# Patient Record
Sex: FEMALE | Race: WHITE | Employment: FULL TIME | URBAN - NONMETROPOLITAN AREA
[De-identification: names, ages, dates, MRNs, and addresses within clinical notes are randomized per-mention and may not be internally consistent; named-entity substitution may affect disease eponyms.]

---

## 2018-06-12 ENCOUNTER — HOSPITAL ENCOUNTER (EMERGENCY)
Age: 23
Discharge: HOME | End: 2018-06-12
Payer: COMMERCIAL

## 2018-06-12 VITALS
DIASTOLIC BLOOD PRESSURE: 75 MMHG | WEIGHT: 174 LBS | TEMPERATURE: 98.3 F | OXYGEN SATURATION: 98 % | RESPIRATION RATE: 16 BRPM | SYSTOLIC BLOOD PRESSURE: 124 MMHG | HEART RATE: 75 BPM

## 2018-06-12 DIAGNOSIS — R51.9 SINUS HEADACHE: Primary | ICD-10-CM

## 2018-06-12 PROCEDURE — 99204 OFFICE O/P NEW MOD 45 MIN: CPT

## 2018-06-12 PROCEDURE — 99213 OFFICE O/P EST LOW 20 MIN: CPT | Performed by: NURSE PRACTITIONER

## 2018-06-12 RX ORDER — NAPROXEN 500 MG/1
500 TABLET ORAL 2 TIMES DAILY WITH MEALS
Qty: 20 TABLET | Refills: 0 | Status: SHIPPED | OUTPATIENT
Start: 2018-06-12 | End: 2018-08-14

## 2018-06-12 RX ORDER — PREDNISONE 20 MG/1
20 TABLET ORAL DAILY
Qty: 5 TABLET | Refills: 0 | Status: SHIPPED | OUTPATIENT
Start: 2018-06-12 | End: 2018-06-17

## 2018-06-12 ASSESSMENT — ENCOUNTER SYMPTOMS
NAUSEA: 0
SINUS PAIN: 0
EYE PAIN: 0
SINUS PRESSURE: 1
VOMITING: 0
ABDOMINAL PAIN: 0
DIARRHEA: 0
CHEST TIGHTNESS: 0
RHINORRHEA: 0
BACK PAIN: 0
PHOTOPHOBIA: 1
SORE THROAT: 0
COUGH: 0

## 2018-06-12 ASSESSMENT — PAIN DESCRIPTION - PAIN TYPE: TYPE: ACUTE PAIN

## 2018-06-12 ASSESSMENT — PAIN SCALES - GENERAL: PAINLEVEL_OUTOF10: 7

## 2018-06-12 ASSESSMENT — PAIN DESCRIPTION - LOCATION: LOCATION: EYE;FACE

## 2018-06-12 ASSESSMENT — PAIN DESCRIPTION - ORIENTATION: ORIENTATION: RIGHT;LEFT;UPPER

## 2018-06-12 ASSESSMENT — PAIN DESCRIPTION - DESCRIPTORS: DESCRIPTORS: ACHING;PRESSURE

## 2018-06-13 ENCOUNTER — HOSPITAL ENCOUNTER (EMERGENCY)
Age: 23
Discharge: HOME | End: 2018-06-14
Payer: COMMERCIAL

## 2018-06-13 VITALS
DIASTOLIC BLOOD PRESSURE: 92 MMHG | RESPIRATION RATE: 20 BRPM | HEART RATE: 96 BPM | TEMPERATURE: 97.8 F | SYSTOLIC BLOOD PRESSURE: 137 MMHG | OXYGEN SATURATION: 99 %

## 2018-06-13 DIAGNOSIS — G43.809 OTHER MIGRAINE WITHOUT STATUS MIGRAINOSUS, NOT INTRACTABLE: Primary | ICD-10-CM

## 2018-06-13 PROCEDURE — 99283 EMERGENCY DEPT VISIT LOW MDM: CPT

## 2018-06-13 PROCEDURE — 6360000002 HC RX W HCPCS: Performed by: NURSE PRACTITIONER

## 2018-06-13 PROCEDURE — 6370000000 HC RX 637 (ALT 250 FOR IP): Performed by: NURSE PRACTITIONER

## 2018-06-13 PROCEDURE — 96372 THER/PROPH/DIAG INJ SC/IM: CPT

## 2018-06-13 RX ORDER — ONDANSETRON 4 MG/1
4 TABLET, ORALLY DISINTEGRATING ORAL ONCE
Status: COMPLETED | OUTPATIENT
Start: 2018-06-13 | End: 2018-06-13

## 2018-06-13 RX ORDER — ORPHENADRINE CITRATE 30 MG/ML
60 INJECTION INTRAMUSCULAR; INTRAVENOUS ONCE
Status: COMPLETED | OUTPATIENT
Start: 2018-06-13 | End: 2018-06-13

## 2018-06-13 RX ORDER — DIPHENHYDRAMINE HCL 25 MG
25 TABLET ORAL ONCE
Status: COMPLETED | OUTPATIENT
Start: 2018-06-13 | End: 2018-06-13

## 2018-06-13 RX ADMIN — ORPHENADRINE CITRATE 60 MG: 30 INJECTION INTRAMUSCULAR; INTRAVENOUS at 23:08

## 2018-06-13 RX ADMIN — DIPHENHYDRAMINE HCL 25 MG: 25 TABLET ORAL at 23:08

## 2018-06-13 RX ADMIN — ONDANSETRON 4 MG: 4 TABLET, ORALLY DISINTEGRATING ORAL at 23:08

## 2018-06-13 ASSESSMENT — ENCOUNTER SYMPTOMS
VOMITING: 0
BACK PAIN: 0
NAUSEA: 0
SORE THROAT: 0
COUGH: 0
SHORTNESS OF BREATH: 0
PHOTOPHOBIA: 1
ABDOMINAL PAIN: 0
WHEEZING: 0
DIARRHEA: 0
BLOOD IN STOOL: 0

## 2018-06-13 ASSESSMENT — PAIN DESCRIPTION - PAIN TYPE: TYPE: ACUTE PAIN

## 2018-06-13 ASSESSMENT — PAIN SCALES - GENERAL: PAINLEVEL_OUTOF10: 9

## 2018-06-13 ASSESSMENT — PAIN DESCRIPTION - LOCATION: LOCATION: HEAD

## 2018-06-14 PROCEDURE — 96372 THER/PROPH/DIAG INJ SC/IM: CPT

## 2018-06-14 PROCEDURE — 6360000002 HC RX W HCPCS: Performed by: NURSE PRACTITIONER

## 2018-06-14 RX ORDER — SUMATRIPTAN 6 MG/.5ML
6 INJECTION, SOLUTION SUBCUTANEOUS ONCE
Status: COMPLETED | OUTPATIENT
Start: 2018-06-14 | End: 2018-06-14

## 2018-06-14 RX ADMIN — SUMATRIPTAN 6 MG: 6 INJECTION, SOLUTION SUBCUTANEOUS at 00:34

## 2018-06-29 ENCOUNTER — TELEPHONE (OUTPATIENT)
Dept: FAMILY MEDICINE CLINIC | Age: 23
End: 2018-06-29

## 2018-06-29 ENCOUNTER — OFFICE VISIT (OUTPATIENT)
Dept: FAMILY MEDICINE CLINIC | Age: 23
End: 2018-06-29
Payer: COMMERCIAL

## 2018-06-29 VITALS
BODY MASS INDEX: 30.26 KG/M2 | HEART RATE: 96 BPM | SYSTOLIC BLOOD PRESSURE: 128 MMHG | RESPIRATION RATE: 12 BRPM | HEIGHT: 65 IN | TEMPERATURE: 98.1 F | DIASTOLIC BLOOD PRESSURE: 82 MMHG | WEIGHT: 181.6 LBS

## 2018-06-29 DIAGNOSIS — Z30.011 ENCOUNTER FOR INITIAL PRESCRIPTION OF CONTRACEPTIVE PILLS: ICD-10-CM

## 2018-06-29 DIAGNOSIS — G43.009 MIGRAINE WITHOUT AURA AND WITHOUT STATUS MIGRAINOSUS, NOT INTRACTABLE: Primary | ICD-10-CM

## 2018-06-29 LAB
CHLAMYDIA TRACHOMATIS BY RT-PCR: NOT DETECTED
CONTROL: PRESENT
CT/NG SOURCE: NORMAL
NEISSERIA GONORRHOEAE BY RT-PCR: NOT DETECTED
PREGNANCY TEST URINE, POC: NEGATIVE

## 2018-06-29 PROCEDURE — 99203 OFFICE O/P NEW LOW 30 MIN: CPT | Performed by: NURSE PRACTITIONER

## 2018-06-29 PROCEDURE — 81025 URINE PREGNANCY TEST: CPT | Performed by: NURSE PRACTITIONER

## 2018-06-29 RX ORDER — SUMATRIPTAN 100 MG/1
100 TABLET, FILM COATED ORAL
Qty: 9 TABLET | Refills: 3 | Status: SHIPPED | OUTPATIENT
Start: 2018-06-29 | End: 2018-11-26 | Stop reason: SDUPTHER

## 2018-06-29 RX ORDER — NORGESTIMATE AND ETHINYL ESTRADIOL 7DAYSX3 28
1 KIT ORAL DAILY
Qty: 28 TABLET | Refills: 11 | Status: SHIPPED | OUTPATIENT
Start: 2018-06-29 | End: 2018-08-25 | Stop reason: SDUPTHER

## 2018-06-29 RX ORDER — DIPHENHYDRAMINE HCL 25 MG
25 CAPSULE ORAL EVERY 6 HOURS PRN
COMMUNITY

## 2018-06-29 RX ORDER — IBUPROFEN 400 MG/1
400 TABLET ORAL EVERY 6 HOURS PRN
COMMUNITY
End: 2019-05-28

## 2018-06-29 ASSESSMENT — PATIENT HEALTH QUESTIONNAIRE - PHQ9
2. FEELING DOWN, DEPRESSED OR HOPELESS: 0
SUM OF ALL RESPONSES TO PHQ QUESTIONS 1-9: 0
SUM OF ALL RESPONSES TO PHQ9 QUESTIONS 1 & 2: 0
1. LITTLE INTEREST OR PLEASURE IN DOING THINGS: 0

## 2018-07-02 ENCOUNTER — TELEPHONE (OUTPATIENT)
Dept: FAMILY MEDICINE CLINIC | Age: 23
End: 2018-07-02

## 2018-07-18 ENCOUNTER — OFFICE VISIT (OUTPATIENT)
Dept: FAMILY MEDICINE CLINIC | Age: 23
End: 2018-07-18
Payer: COMMERCIAL

## 2018-07-18 VITALS
TEMPERATURE: 97.9 F | HEART RATE: 82 BPM | HEIGHT: 66 IN | BODY MASS INDEX: 29.25 KG/M2 | SYSTOLIC BLOOD PRESSURE: 108 MMHG | RESPIRATION RATE: 14 BRPM | DIASTOLIC BLOOD PRESSURE: 70 MMHG | WEIGHT: 182 LBS

## 2018-07-18 DIAGNOSIS — N92.6 MISSED PERIOD: ICD-10-CM

## 2018-07-18 DIAGNOSIS — Z01.419 PAP SMEAR, AS PART OF ROUTINE GYNECOLOGICAL EXAMINATION: Primary | ICD-10-CM

## 2018-07-18 LAB
CONTROL: NORMAL
PREGNANCY TEST URINE, POC: NEGATIVE

## 2018-07-18 PROCEDURE — 81025 URINE PREGNANCY TEST: CPT | Performed by: NURSE PRACTITIONER

## 2018-07-18 PROCEDURE — 99395 PREV VISIT EST AGE 18-39: CPT | Performed by: NURSE PRACTITIONER

## 2018-07-18 NOTE — PATIENT INSTRUCTIONS
You may receive a survey about your visit with us today. The feedback from our patients helps us identify what is working well and where the service to all patients can be enhanced. Thank you! Patient Education        Well Visit, Ages 25 to 48: Care Instructions  Your Care Instructions    Physical exams can help you stay healthy. Your doctor has checked your overall health and may have suggested ways to take good care of yourself. He or she also may have recommended tests. At home, you can help prevent illness with healthy eating, regular exercise, and other steps. Follow-up care is a key part of your treatment and safety. Be sure to make and go to all appointments, and call your doctor if you are having problems. It's also a good idea to know your test results and keep a list of the medicines you take. How can you care for yourself at home? · Reach and stay at a healthy weight. This will lower your risk for many problems, such as obesity, diabetes, heart disease, and high blood pressure. · Get at least 30 minutes of physical activity on most days of the week. Walking is a good choice. You also may want to do other activities, such as running, swimming, cycling, or playing tennis or team sports. Discuss any changes in your exercise program with your doctor. · Do not smoke or allow others to smoke around you. If you need help quitting, talk to your doctor about stop-smoking programs and medicines. These can increase your chances of quitting for good. · Talk to your doctor about whether you have any risk factors for sexually transmitted infections (STIs). Having one sex partner (who does not have STIs and does not have sex with anyone else) is a good way to avoid these infections. · Use birth control if you do not want to have children at this time. Talk with your doctor about the choices available and what might be best for you. · Protect your skin from too much sun.  When you're outdoors from 10 a.m. to 4 p.m., stay in the shade or cover up with clothing and a hat with a wide brim. Wear sunglasses that block UV rays. Even when it's cloudy, put broad-spectrum sunscreen (SPF 30 or higher) on any exposed skin. · See a dentist one or two times a year for checkups and to have your teeth cleaned. · Wear a seat belt in the car. · Drink alcohol in moderation, if at all. That means no more than 2 drinks a day for men and 1 drink a day for women. Follow your doctor's advice about when to have certain tests. These tests can spot problems early. For everyone  · Cholesterol. Have the fat (cholesterol) in your blood tested after age 21. Your doctor will tell you how often to have this done based on your age, family history, or other things that can increase your risk for heart disease. · Blood pressure. Have your blood pressure checked during a routine doctor visit. Your doctor will tell you how often to check your blood pressure based on your age, your blood pressure results, and other factors. · Vision. Talk with your doctor about how often to have a glaucoma test.  · Diabetes. Ask your doctor whether you should have tests for diabetes. · Colon cancer. Have a test for colon cancer at age 48. You may have one of several tests. If you are younger than 48, you may need a test earlier if you have any risk factors. Risk factors include whether you already had a precancerous polyp removed from your colon or whether your parent, brother, sister, or child has had colon cancer. For women  · Breast exam and mammogram. Talk to your doctor about when you should have a clinical breast exam and a mammogram. Medical experts differ on whether and how often women under 50 should have these tests. Your doctor can help you decide what is right for you. · Pap test and pelvic exam. Begin Pap tests at age 24. A Pap test is the best way to find cervical cancer.  The test often is part of a pelvic exam. Ask how often to have this

## 2018-07-18 NOTE — PROGRESS NOTES
external genitalia, vulva, vagina, cervix, uterus and adnexa, VULVA: normal appearing vulva with no masses, tenderness or lesions, VAGINA: normal appearing vagina with normal color and discharge, no lesions, PELVIC FLOOR EXAM: no cystocele, rectocele or prolapse noted, CERVIX: normal appearing cervix without discharge or lesions, small amount of blood noted in the os, UTERUS: uterus is normal size, shape, consistency and nontender, ADNEXA: normal adnexa in size, nontender and no masses, no masses, PAP: Pap smear done today, thin-prep method, pt had a gonorrhea and chlamydia test that was negative  A few weeks ago. ASSESSMENT:   well woman  no contraindication to begin use of oral contraceptives  Encounter Diagnoses   Name Primary?  Missed period     Pap smear, as part of routine gynecological examination Yes     PLAN:   pap smear  counseled on breast self exam, STD prevention and use and side effects of OCP's  return annually or prn  Discussed with pt the need for condoms for STD protection. Pt is going to start her birth control the Sunday after her next period.

## 2018-07-19 ENCOUNTER — TELEPHONE (OUTPATIENT)
Dept: FAMILY MEDICINE CLINIC | Age: 23
End: 2018-07-19

## 2018-07-19 DIAGNOSIS — N76.0 BACTERIAL VAGINOSIS: Primary | ICD-10-CM

## 2018-07-19 DIAGNOSIS — B96.89 BACTERIAL VAGINOSIS: Primary | ICD-10-CM

## 2018-07-19 RX ORDER — METRONIDAZOLE 500 MG/1
500 TABLET ORAL 2 TIMES DAILY
Qty: 14 TABLET | Refills: 0 | Status: SHIPPED | OUTPATIENT
Start: 2018-07-19 | End: 2018-07-29

## 2018-07-19 NOTE — TELEPHONE ENCOUNTER
----- Message from DEL Davis CNP sent at 7/19/2018 12:30 PM EDT -----  I left a voicemail for this pt to contact our office regarding her pap test results. She has bacterial vaginosis which is a bacteria that can come from the cervix, it is not a sexually transmitted infection. It is treated with a medication called flagyl which I will send to her pharmacy. Also her pap identified some atypical cells and we are doing further testing on them called an HPV test. The results should be back next week. Based on these results further testing may be indicated, or a repeat pap in one year. She can call us if she has further questions.

## 2018-07-24 LAB — CYTOLOGY THIN PREP PAP: NORMAL

## 2018-07-30 ENCOUNTER — TELEPHONE (OUTPATIENT)
Dept: FAMILY MEDICINE CLINIC | Age: 23
End: 2018-07-30

## 2018-07-30 NOTE — TELEPHONE ENCOUNTER
----- Message from DEL Urbina CNP sent at 7/30/2018 12:04 PM EDT -----  Let Nik Gao know the HPV testing was negative. However, because she did have some abnormal cells on the PAP, recommend she get another PAP in 1 year. She can schedule that with Chance or myself if she wishes.

## 2018-08-13 ENCOUNTER — HOSPITAL ENCOUNTER (EMERGENCY)
Age: 23
Discharge: HOME | End: 2018-08-14
Attending: FAMILY MEDICINE
Payer: COMMERCIAL

## 2018-08-13 ENCOUNTER — APPOINTMENT (OUTPATIENT)
Dept: CT IMAGING | Age: 23
End: 2018-08-13
Payer: COMMERCIAL

## 2018-08-13 ENCOUNTER — TELEPHONE (OUTPATIENT)
Dept: FAMILY MEDICINE CLINIC | Age: 23
End: 2018-08-13

## 2018-08-13 DIAGNOSIS — R10.30 LOWER ABDOMINAL PAIN: Primary | ICD-10-CM

## 2018-08-13 DIAGNOSIS — N30.00 ACUTE CYSTITIS WITHOUT HEMATURIA: ICD-10-CM

## 2018-08-13 LAB
ALBUMIN SERPL-MCNC: 5.1 G/DL (ref 3.5–5.1)
ALP BLD-CCNC: 47 U/L (ref 38–126)
ALT SERPL-CCNC: 11 U/L (ref 11–66)
ANION GAP SERPL CALCULATED.3IONS-SCNC: 14 MEQ/L (ref 8–16)
AST SERPL-CCNC: 13 U/L (ref 5–40)
BACTERIA: ABNORMAL /HPF
BASOPHILS # BLD: 0.1 %
BASOPHILS ABSOLUTE: 0 THOU/MM3 (ref 0–0.1)
BILIRUB SERPL-MCNC: 0.6 MG/DL (ref 0.3–1.2)
BILIRUBIN DIRECT: < 0.2 MG/DL (ref 0–0.3)
BILIRUBIN URINE: NEGATIVE
BLOOD, URINE: NEGATIVE
BUN BLDV-MCNC: 11 MG/DL (ref 7–22)
CALCIUM SERPL-MCNC: 9.7 MG/DL (ref 8.5–10.5)
CASTS 2: ABNORMAL /LPF
CASTS UA: ABNORMAL /LPF
CHARACTER, URINE: ABNORMAL
CHLORIDE BLD-SCNC: 102 MEQ/L (ref 98–111)
CO2: 24 MEQ/L (ref 23–33)
COLOR: YELLOW
CREAT SERPL-MCNC: 0.7 MG/DL (ref 0.4–1.2)
CRYSTALS, UA: ABNORMAL
EOSINOPHIL # BLD: 0.5 %
EOSINOPHILS ABSOLUTE: 0 THOU/MM3 (ref 0–0.4)
EPITHELIAL CELLS, UA: ABNORMAL /HPF
ERYTHROCYTE [DISTWIDTH] IN BLOOD BY AUTOMATED COUNT: 11.9 % (ref 11.5–14.5)
ERYTHROCYTE [DISTWIDTH] IN BLOOD BY AUTOMATED COUNT: 36.9 FL (ref 35–45)
GFR SERPL CREATININE-BSD FRML MDRD: > 90 ML/MIN/1.73M2
GLUCOSE BLD-MCNC: 96 MG/DL (ref 70–108)
GLUCOSE URINE: NEGATIVE MG/DL
HCT VFR BLD CALC: 42.8 % (ref 37–47)
HEMOGLOBIN: 15 GM/DL (ref 12–16)
IMMATURE GRANS (ABS): 0.02 THOU/MM3 (ref 0–0.07)
IMMATURE GRANULOCYTES: 0.2 %
KETONES, URINE: NEGATIVE
LEUKOCYTE ESTERASE, URINE: ABNORMAL
LIPASE: 30.5 U/L (ref 5.6–51.3)
LYMPHOCYTES # BLD: 29.8 %
LYMPHOCYTES ABSOLUTE: 2.5 THOU/MM3 (ref 1–4.8)
MCH RBC QN AUTO: 30.1 PG (ref 26–33)
MCHC RBC AUTO-ENTMCNC: 35 GM/DL (ref 32.2–35.5)
MCV RBC AUTO: 85.8 FL (ref 81–99)
MISCELLANEOUS 2: ABNORMAL
MONOCYTES # BLD: 7.6 %
MONOCYTES ABSOLUTE: 0.6 THOU/MM3 (ref 0.4–1.3)
NITRITE, URINE: POSITIVE
NUCLEATED RED BLOOD CELLS: 0 /100 WBC
OSMOLALITY CALCULATION: 278.7 MOSMOL/KG (ref 275–300)
PH UA: 6.5
PLATELET # BLD: 246 THOU/MM3 (ref 130–400)
PMV BLD AUTO: 10.8 FL (ref 9.4–12.4)
POTASSIUM SERPL-SCNC: 3.7 MEQ/L (ref 3.5–5.2)
PREGNANCY, URINE: NEGATIVE
PROTEIN UA: NEGATIVE
RBC # BLD: 4.99 MILL/MM3 (ref 4.2–5.4)
RBC URINE: ABNORMAL /HPF
RENAL EPITHELIAL, UA: ABNORMAL
SEG NEUTROPHILS: 61.8 %
SEGMENTED NEUTROPHILS ABSOLUTE COUNT: 5.1 THOU/MM3 (ref 1.8–7.7)
SODIUM BLD-SCNC: 140 MEQ/L (ref 135–145)
SPECIFIC GRAVITY, URINE: 1.03 (ref 1–1.03)
TOTAL PROTEIN: 8 G/DL (ref 6.1–8)
UROBILINOGEN, URINE: 1 EU/DL
WBC # BLD: 8.3 THOU/MM3 (ref 4.8–10.8)
WBC UA: ABNORMAL /HPF
YEAST: ABNORMAL

## 2018-08-13 PROCEDURE — 82248 BILIRUBIN DIRECT: CPT

## 2018-08-13 PROCEDURE — 87186 SC STD MICRODIL/AGAR DIL: CPT

## 2018-08-13 PROCEDURE — 80053 COMPREHEN METABOLIC PANEL: CPT

## 2018-08-13 PROCEDURE — 81001 URINALYSIS AUTO W/SCOPE: CPT

## 2018-08-13 PROCEDURE — 87077 CULTURE AEROBIC IDENTIFY: CPT

## 2018-08-13 PROCEDURE — 85025 COMPLETE CBC W/AUTO DIFF WBC: CPT

## 2018-08-13 PROCEDURE — 99284 EMERGENCY DEPT VISIT MOD MDM: CPT

## 2018-08-13 PROCEDURE — 81025 URINE PREGNANCY TEST: CPT

## 2018-08-13 PROCEDURE — 83690 ASSAY OF LIPASE: CPT

## 2018-08-13 PROCEDURE — 74176 CT ABD & PELVIS W/O CONTRAST: CPT

## 2018-08-13 PROCEDURE — 87184 SC STD DISK METHOD PER PLATE: CPT

## 2018-08-13 PROCEDURE — 87086 URINE CULTURE/COLONY COUNT: CPT

## 2018-08-13 PROCEDURE — 36415 COLL VENOUS BLD VENIPUNCTURE: CPT

## 2018-08-13 ASSESSMENT — PAIN DESCRIPTION - ORIENTATION
ORIENTATION: LOWER;LEFT;RIGHT
ORIENTATION: RIGHT;LEFT
ORIENTATION: RIGHT;LEFT;LOWER

## 2018-08-13 ASSESSMENT — ENCOUNTER SYMPTOMS
DIARRHEA: 0
CHEST TIGHTNESS: 0
SHORTNESS OF BREATH: 0
COUGH: 0
VOICE CHANGE: 0
NAUSEA: 0
WHEEZING: 0
BACK PAIN: 0
CONSTIPATION: 0
ABDOMINAL PAIN: 1
VOMITING: 0
TROUBLE SWALLOWING: 0
RECTAL PAIN: 0

## 2018-08-13 ASSESSMENT — PAIN DESCRIPTION - PAIN TYPE
TYPE: ACUTE PAIN

## 2018-08-13 ASSESSMENT — PAIN DESCRIPTION - LOCATION
LOCATION: ABDOMEN

## 2018-08-13 ASSESSMENT — PAIN SCALES - GENERAL
PAINLEVEL_OUTOF10: 3
PAINLEVEL_OUTOF10: 3

## 2018-08-13 ASSESSMENT — PAIN DESCRIPTION - DESCRIPTORS: DESCRIPTORS: SHARP

## 2018-08-14 VITALS
BODY MASS INDEX: 29.82 KG/M2 | OXYGEN SATURATION: 99 % | RESPIRATION RATE: 16 BRPM | HEIGHT: 65 IN | HEART RATE: 96 BPM | DIASTOLIC BLOOD PRESSURE: 77 MMHG | WEIGHT: 179 LBS | SYSTOLIC BLOOD PRESSURE: 121 MMHG | TEMPERATURE: 98.6 F

## 2018-08-14 PROCEDURE — 6360000002 HC RX W HCPCS: Performed by: FAMILY MEDICINE

## 2018-08-14 PROCEDURE — 96365 THER/PROPH/DIAG IV INF INIT: CPT

## 2018-08-14 PROCEDURE — 2580000003 HC RX 258: Performed by: FAMILY MEDICINE

## 2018-08-14 PROCEDURE — 87040 BLOOD CULTURE FOR BACTERIA: CPT

## 2018-08-14 PROCEDURE — 36415 COLL VENOUS BLD VENIPUNCTURE: CPT

## 2018-08-14 RX ORDER — DEXTROSE MONOHYDRATE 50 MG/ML
INJECTION, SOLUTION INTRAVENOUS
Status: DISCONTINUED
Start: 2018-08-14 | End: 2018-08-14 | Stop reason: HOSPADM

## 2018-08-14 RX ORDER — NAPROXEN 500 MG/1
500 TABLET ORAL 2 TIMES DAILY WITH MEALS
Qty: 20 TABLET | Refills: 0 | Status: SHIPPED | OUTPATIENT
Start: 2018-08-14 | End: 2018-12-10

## 2018-08-14 RX ORDER — CEFTRIAXONE 1 G/1
INJECTION, POWDER, FOR SOLUTION INTRAMUSCULAR; INTRAVENOUS
Status: DISCONTINUED
Start: 2018-08-14 | End: 2018-08-14 | Stop reason: HOSPADM

## 2018-08-14 RX ORDER — NITROFURANTOIN 25; 75 MG/1; MG/1
100 CAPSULE ORAL 2 TIMES DAILY
Qty: 20 CAPSULE | Refills: 0 | Status: SHIPPED | OUTPATIENT
Start: 2018-08-14 | End: 2018-08-24

## 2018-08-14 RX ORDER — CIPROFLOXACIN 500 MG/1
500 TABLET, FILM COATED ORAL 2 TIMES DAILY
Qty: 6 TABLET | Refills: 0 | Status: SHIPPED | OUTPATIENT
Start: 2018-08-14 | End: 2018-08-24

## 2018-08-14 RX ADMIN — CEFTRIAXONE SODIUM 1 G: 1 INJECTION, POWDER, FOR SOLUTION INTRAMUSCULAR; INTRAVENOUS at 00:26

## 2018-08-14 ASSESSMENT — PAIN DESCRIPTION - LOCATION: LOCATION: ABDOMEN

## 2018-08-14 ASSESSMENT — PAIN SCALES - GENERAL: PAINLEVEL_OUTOF10: 1

## 2018-08-14 ASSESSMENT — PAIN DESCRIPTION - PAIN TYPE: TYPE: ACUTE PAIN

## 2018-08-14 NOTE — ED TRIAGE NOTES
Patient arrived to ED with c/o lower bilateral abdominal pain that has been on-going for approximately 1 week. Patient reports possible chance of pregnancy, but took a home pregnancy test and it was negative. Monitor applied and vitals taken/assessment completed. Blood work and urine has been sent, by intake.

## 2018-08-14 NOTE — ED PROVIDER NOTES
[facility] EMERGENCY DEPT      CHIEF COMPLAINT       Chief Complaint   Patient presents with    Abdominal Pain       Nurses Notes reviewed and I agree except as noted in the HPI. HISTORY OF PRESENT ILLNESS    Randi Hyde is a 26 y.o. female who presents  To the emergency department with lower abdominal pain for the last 1 week. Patient said that the pain is dull achy constantly present nonradiating. I offered her some pain medication but she declined saying the pain is not that severe. Patient has no associated fevers. She denies any associated dysuria or increasing frequency or pyuria. She denies any associated vaginal bleeding or vaginal discharge. She said that she is not pregnant and her last menstrual period was 2  weeks ago. She denies any flank pain, hematuria, she denies any melena or hematochezia. She denies nausea vomiting or diarrhea she denies jaundice. She denies upper abdominal pain she denies any abdominal distention. She denies any chest symptomatology she has no headache neck pain or back pain she has no musculoskeletal skin or neurologic symptoms at this time. Clinically she looks well in no acute distress with an otherwise normal exam.      REVIEW OF SYSTEMS     Review of Systems   Constitutional: Negative. Negative for chills, fever and unexpected weight change. HENT: Negative. Negative for congestion, trouble swallowing and voice change. Respiratory: Negative for cough, chest tightness, shortness of breath and wheezing. Cardiovascular: Negative for chest pain and leg swelling. Gastrointestinal: Positive for abdominal pain. Negative for constipation, diarrhea, nausea, rectal pain and vomiting. Genitourinary: Negative. Negative for decreased urine volume, difficulty urinating, dysuria, flank pain, frequency, genital sores, hematuria, pelvic pain, urgency, vaginal bleeding and vaginal discharge. Musculoskeletal: Negative.   Negative for back pain, myalgias tracheal deviation present. No thyromegaly present. Cardiovascular: Normal rate, regular rhythm, normal heart sounds and intact distal pulses. No murmur heard. Pulmonary/Chest: Effort normal and breath sounds normal. No respiratory distress. She has no wheezes. She has no rales. She exhibits no tenderness. Abdominal: Soft. Bowel sounds are normal. She exhibits no distension and no mass. There is no tenderness. There is no rebound and no guarding. Musculoskeletal: Normal range of motion. She exhibits no edema or tenderness. Lymphadenopathy:     She has no cervical adenopathy. Neurological: She is alert and oriented to person, place, and time. No cranial nerve deficit. Coordination normal.   Skin: Skin is warm. No rash noted. She is not diaphoretic. No erythema. No pallor. patient declined pelvic exam at this time. DIFFERENTIAL DIAGNOSIS:       DIAGNOSTIC RESULTS     EKG: All EKG's are interpreted by the Emergency Department Physician who either signs or Co-signs this chart in the absence of a cardiologist.      RADIOLOGY: non-plain film images(s) such as CT, Ultrasound and MRI are read by the radiologist.  Plain radiographic images are visualized and preliminarily interpreted by the emergency physician unless otherwise stated below. CT ABDOMEN PELVIS WO CONTRAST Additional Contrast? None   Final Result   1. Nonvisualization of appendix without acute appearing inflammatory change of the right lower quadrant. 2. Mild stool retention. 3. Dominant follicle or small cyst of the right ovary noted. **This report has been created using voice recognition software. It may contain minor errors which are inherent in voice recognition technology. **      Final report electronically signed by Dr. Nando Lopez on 8/13/2018 11:58 PM          LABS:   Labs Reviewed   URINE WITH REFLEXED MICRO - Abnormal; Notable for the following:        Result Value    Nitrite, Urine POSITIVE (*)

## 2018-08-15 LAB
ORGANISM: ABNORMAL
URINE CULTURE REFLEX: ABNORMAL

## 2018-08-16 ENCOUNTER — TELEPHONE (OUTPATIENT)
Dept: PHARMACY | Age: 23
End: 2018-08-16

## 2018-08-19 LAB
BLOOD CULTURE, ROUTINE: NORMAL
BLOOD CULTURE, ROUTINE: NORMAL

## 2018-08-25 DIAGNOSIS — Z30.011 ENCOUNTER FOR INITIAL PRESCRIPTION OF CONTRACEPTIVE PILLS: ICD-10-CM

## 2018-08-25 RX ORDER — NORGESTIMATE AND ETHINYL ESTRADIOL 7DAYSX3 28
1 KIT ORAL DAILY
Qty: 28 TABLET | Refills: 11 | Status: SHIPPED | OUTPATIENT
Start: 2018-08-25 | End: 2018-11-02 | Stop reason: SDUPTHER

## 2018-08-25 NOTE — TELEPHONE ENCOUNTER
From: Ashley Garcia  Sent: 8/24/2018 1:04 PM EDT  Subject: Medication Renewal Request    Ashley Garcia would like a refill of the following medications:     Justyna Lyme Estrad Triphasic (TRI-SPRINTEC) 0.18/0.215/0.25 MG-35 MCG TABS Breannariosman Cantu, APRN - CNP]    Preferred pharmacy: [address] [place] [address] -  [phone]

## 2018-10-30 ENCOUNTER — OFFICE VISIT (OUTPATIENT)
Dept: FAMILY MEDICINE CLINIC | Age: 23
End: 2018-10-30
Payer: COMMERCIAL

## 2018-10-30 ENCOUNTER — NURSE ONLY (OUTPATIENT)
Dept: LAB | Age: 23
End: 2018-10-30

## 2018-10-30 ENCOUNTER — TELEPHONE (OUTPATIENT)
Dept: FAMILY MEDICINE CLINIC | Age: 23
End: 2018-10-30

## 2018-10-30 VITALS
HEART RATE: 88 BPM | WEIGHT: 181 LBS | BODY MASS INDEX: 30.16 KG/M2 | RESPIRATION RATE: 14 BRPM | DIASTOLIC BLOOD PRESSURE: 76 MMHG | HEIGHT: 65 IN | SYSTOLIC BLOOD PRESSURE: 132 MMHG | TEMPERATURE: 98.5 F

## 2018-10-30 DIAGNOSIS — N92.6 MISSED MENSES: ICD-10-CM

## 2018-10-30 DIAGNOSIS — N92.6 MISSED MENSES: Primary | ICD-10-CM

## 2018-10-30 LAB
BILIRUBIN, POC: NORMAL
BLOOD URINE, POC: NORMAL
CLARITY, POC: NORMAL
COLOR, POC: NORMAL
CONTROL: NORMAL
GLUCOSE URINE, POC: NORMAL
HCG,BETA SUBUNIT,QUAL,SERUM: < 0.1 MIU/ML (ref 0–5)
KETONES, POC: NORMAL
LEUKOCYTE EST, POC: NORMAL
NITRITE, POC: NORMAL
PH, POC: 7
PREGNANCY TEST URINE, POC: NORMAL
PROTEIN, POC: NORMAL
SPECIFIC GRAVITY, POC: 1.02
UROBILINOGEN, POC: 0.2

## 2018-10-30 PROCEDURE — G8417 CALC BMI ABV UP PARAM F/U: HCPCS | Performed by: NURSE PRACTITIONER

## 2018-10-30 PROCEDURE — 81025 URINE PREGNANCY TEST: CPT | Performed by: NURSE PRACTITIONER

## 2018-10-30 PROCEDURE — 81003 URINALYSIS AUTO W/O SCOPE: CPT | Performed by: NURSE PRACTITIONER

## 2018-10-30 PROCEDURE — 99213 OFFICE O/P EST LOW 20 MIN: CPT | Performed by: NURSE PRACTITIONER

## 2018-10-30 PROCEDURE — 1036F TOBACCO NON-USER: CPT | Performed by: NURSE PRACTITIONER

## 2018-10-30 PROCEDURE — G8484 FLU IMMUNIZE NO ADMIN: HCPCS | Performed by: NURSE PRACTITIONER

## 2018-10-30 PROCEDURE — G8428 CUR MEDS NOT DOCUMENT: HCPCS | Performed by: NURSE PRACTITIONER

## 2018-10-30 NOTE — PROGRESS NOTES
Chief Complaint   Patient presents with    Routine Prenatal Visit     positive pregnancy test        History obtained from the patient. SUBJECTIVE:  Diana Andrews is a 26 y.o. female that presents today for missed menses    Missed menses  Last menses was end of August. She was still taking the birth control. She missed her menses in Sept and did a pregnancy test about 2 weekends ago which was positive. She did stop her birth control about the week before her positive pregnancy test. She started having menstrual bleeding this morning. Bleeding is dark, no clots and no cramps. Age/Gender Health Maintenance    Lipid - 35  DM Screen - 35  Colon Cancer Screening - 48  Lung Cancer Screening (Age 54 to [de-identified] with 30 pack year hx, current smoker or quit within past 15 years) - n/a    Tetanus - needs  Influenza Vaccine - 9/18  Pneumonia Vaccine - 65  Zostavax - 50   HPV Vaccine - n/a    Breast Cancer Screening - 50  Cervical Cancer Screening - needs  Osteoporosis Screening - 65  Chlamydia Screen - needs      Falls screening - n/a    Current Outpatient Prescriptions   Medication Sig Dispense Refill    Norgestim-Eth Estrad Triphasic (TRI-SPRINTEC) 0.18/0.215/0.25 MG-35 MCG TABS Take 1 tablet by mouth daily 28 tablet 11    naproxen (NAPROSYN) 500 MG tablet Take 1 tablet by mouth 2 times daily (with meals) for 10 days 20 tablet 0    ibuprofen (ADVIL;MOTRIN) 400 MG tablet Take 400 mg by mouth every 6 hours as needed for Pain      diphenhydrAMINE (BENADRYL) 25 MG capsule Take 25 mg by mouth every 6 hours as needed for Itching      SUMAtriptan (IMITREX) 100 MG tablet Take 1 tablet by mouth once as needed for Migraine 9 tablet 3    Aspirin-Acetaminophen-Caffeine (EXCEDRIN MIGRAINE PO) Take 2 tablets by mouth       No current facility-administered medications for this visit. No orders of the defined types were placed in this encounter.         All medications reviewed and reconciled, including OTC and herbal

## 2018-11-02 DIAGNOSIS — Z30.011 ENCOUNTER FOR INITIAL PRESCRIPTION OF CONTRACEPTIVE PILLS: ICD-10-CM

## 2018-11-02 RX ORDER — NORGESTIMATE AND ETHINYL ESTRADIOL 7DAYSX3 28
1 KIT ORAL DAILY
Qty: 28 TABLET | Refills: 11 | Status: SHIPPED | OUTPATIENT
Start: 2018-11-02 | End: 2018-11-26 | Stop reason: SDUPTHER

## 2018-11-26 ENCOUNTER — HOSPITAL ENCOUNTER (EMERGENCY)
Age: 23
Discharge: HOME | End: 2018-11-26
Payer: COMMERCIAL

## 2018-11-26 VITALS
OXYGEN SATURATION: 99 % | HEART RATE: 90 BPM | TEMPERATURE: 99.1 F | DIASTOLIC BLOOD PRESSURE: 84 MMHG | SYSTOLIC BLOOD PRESSURE: 127 MMHG | BODY MASS INDEX: 28.32 KG/M2 | RESPIRATION RATE: 16 BRPM | WEIGHT: 170 LBS | HEIGHT: 65 IN

## 2018-11-26 DIAGNOSIS — Z30.011 ENCOUNTER FOR INITIAL PRESCRIPTION OF CONTRACEPTIVE PILLS: ICD-10-CM

## 2018-11-26 DIAGNOSIS — G43.009 MIGRAINE WITHOUT AURA AND WITHOUT STATUS MIGRAINOSUS, NOT INTRACTABLE: ICD-10-CM

## 2018-11-26 DIAGNOSIS — S29.019A STRAIN OF THORACIC REGION, INITIAL ENCOUNTER: Primary | ICD-10-CM

## 2018-11-26 LAB
BILIRUBIN URINE: NEGATIVE
BLOOD, URINE: NEGATIVE
CHARACTER, URINE: CLEAR
COLOR: YELLOW
GLUCOSE, URINE: NEGATIVE MG/DL
KETONES, URINE: 15
LEUKOCYTES, UA: NEGATIVE
NITRATE, UA: NEGATIVE
PH UA: 7 (ref 5–9)
PREGNANCY, URINE: NEGATIVE
PROTEIN UA: NEGATIVE MG/DL
REFLEX TO URINE C & S: ABNORMAL
SPECIFIC GRAVITY UA: 1.02 (ref 1–1.03)
UROBILINOGEN, URINE: 0.2 EU/DL (ref 0–1)

## 2018-11-26 PROCEDURE — 84703 CHORIONIC GONADOTROPIN ASSAY: CPT

## 2018-11-26 PROCEDURE — 81003 URINALYSIS AUTO W/O SCOPE: CPT

## 2018-11-26 PROCEDURE — 99213 OFFICE O/P EST LOW 20 MIN: CPT | Performed by: NURSE PRACTITIONER

## 2018-11-26 PROCEDURE — 99213 OFFICE O/P EST LOW 20 MIN: CPT

## 2018-11-26 RX ORDER — SUMATRIPTAN 100 MG/1
100 TABLET, FILM COATED ORAL
Qty: 9 TABLET | Refills: 3 | Status: SHIPPED | OUTPATIENT
Start: 2018-11-26 | End: 2019-12-17 | Stop reason: SDUPTHER

## 2018-11-26 RX ORDER — NAPROXEN 500 MG/1
500 TABLET ORAL 2 TIMES DAILY WITH MEALS
Qty: 20 TABLET | Refills: 0 | Status: SHIPPED | OUTPATIENT
Start: 2018-11-26 | End: 2018-12-10 | Stop reason: ALTCHOICE

## 2018-11-26 RX ORDER — TIZANIDINE 4 MG/1
4 TABLET ORAL EVERY 6 HOURS PRN
Qty: 28 TABLET | Refills: 0 | Status: SHIPPED | OUTPATIENT
Start: 2018-11-26 | End: 2018-12-03

## 2018-11-26 RX ORDER — NORGESTIMATE AND ETHINYL ESTRADIOL 7DAYSX3 28
1 KIT ORAL DAILY
Qty: 28 TABLET | Refills: 11 | Status: SHIPPED | OUTPATIENT
Start: 2018-11-26 | End: 2018-12-27 | Stop reason: SDUPTHER

## 2018-11-26 ASSESSMENT — PAIN DESCRIPTION - ORIENTATION: ORIENTATION: LEFT

## 2018-11-26 ASSESSMENT — ENCOUNTER SYMPTOMS
DIARRHEA: 0
ABDOMINAL SWELLING: 0
NAUSEA: 0
ABDOMINAL PAIN: 0
BACK PAIN: 1
COLOR CHANGE: 0
BOWEL INCONTINENCE: 0
VOMITING: 0
ABDOMINAL DISTENTION: 0

## 2018-11-26 ASSESSMENT — PAIN SCALES - GENERAL: PAINLEVEL_OUTOF10: 7

## 2018-11-26 ASSESSMENT — PAIN DESCRIPTION - DESCRIPTORS: DESCRIPTORS: ACHING

## 2018-11-26 ASSESSMENT — PAIN DESCRIPTION - LOCATION: LOCATION: FLANK

## 2018-12-10 ENCOUNTER — OFFICE VISIT (OUTPATIENT)
Dept: FAMILY MEDICINE CLINIC | Age: 23
End: 2018-12-10
Payer: COMMERCIAL

## 2018-12-10 VITALS
SYSTOLIC BLOOD PRESSURE: 118 MMHG | RESPIRATION RATE: 14 BRPM | HEART RATE: 80 BPM | WEIGHT: 182 LBS | DIASTOLIC BLOOD PRESSURE: 66 MMHG | BODY MASS INDEX: 30.32 KG/M2 | HEIGHT: 65 IN | TEMPERATURE: 98.1 F

## 2018-12-10 DIAGNOSIS — G89.29 CHRONIC BILATERAL LOW BACK PAIN WITHOUT SCIATICA: Primary | ICD-10-CM

## 2018-12-10 DIAGNOSIS — M54.50 CHRONIC BILATERAL LOW BACK PAIN WITHOUT SCIATICA: Primary | ICD-10-CM

## 2018-12-10 PROCEDURE — 99213 OFFICE O/P EST LOW 20 MIN: CPT | Performed by: NURSE PRACTITIONER

## 2018-12-10 RX ORDER — BACLOFEN 10 MG/1
TABLET ORAL
Qty: 30 TABLET | Refills: 1 | Status: SHIPPED | OUTPATIENT
Start: 2018-12-10 | End: 2019-12-17 | Stop reason: SDUPTHER

## 2018-12-10 NOTE — PROGRESS NOTES
SUBJECTIVE:  Reddy Hodgson is a 26 y.o. y/o female that presents with Back Pain (Pt is c/o thoracic spine pain that started in August but worsened about 3 weeks ago. She went to  and was given zanaflex which helps relieve the sxs but makes her drowsy so she can only take it at night. )  . HPI:  Symptoms have been present for 5 month(s). she describes the pain as sharp, aching  in the lumbar region. Inciting injury or history of trauma? Yes - in senior year of highschool had a lot of back problems used the chiropractor  Pain is relieved by - Zanaflex, heat  Pain is aggravated by - certain movements  Radiation of the pain? Yes - will sometimes radiate up to her rib cage and around  Paresthesias of the extremities? No  Saddle anesthesia? No  Bowel or bladder incontinence? No  Treatments tried - NSAIDS, muscle relaxants        OBJECTIVE:  /66   Pulse 80   Temp 98.1 °F (36.7 °C)   Resp 14   Ht 5' 5\" (1.651 m)   Wt 182 lb (82.6 kg)   LMP 11/28/2018   BMI 30.29 kg/m²   Physical Examination: General appearance - alert, well appearing, and in no distress  Chest - clear to auscultation, no wheezes, rales or rhonchi, symmetric air entry  Heart - normal rate, regular rhythm, normal S1, S2, no murmurs, rubs, clicks or gallops  Back exam - full range of motion, no tenderness, palpable spasm or pain on motion, tenderness noted bilateral lumbar and thoracic paraspinous muscles, negative straight-leg raise bilaterally, normal reflexes and strength bilateral lower extremities, sensory exam intact bilateral lower extremities,  5/5 strength globally and symmetrically in the LEs, 2+ patellar reflexes b/l  Extremities - peripheral pulses normal, no pedal edema, no clubbing or cyanosis  Skin -  Skin color, texture, turgor normal. No rashes or lesions. Psych - Affect normal, no evidence of depression or anxiety    ASSESSMENT & PLAN  Kira Neil was seen today for back pain.     Diagnoses and all orders for this

## 2018-12-10 NOTE — PROGRESS NOTES
Visit Information    Have you changed or started any medications since your last visit including any over-the-counter medicines, vitamins, or herbal medicines? no   Are you having any side effects from any of your medications? -  no  Have you stopped taking any of your medications? Is so, why? -  no    Have you seen any other physician or provider since your last visit? Yes - Records Obtained  Have you had any other diagnostic tests since your last visit? Yes - Records Obtained  Have you been seen in the emergency room and/or had an admission to a hospital since we last saw you? No  Have you had your routine dental cleaning in the past 6 months? no    Have you activated your [employer] account? If not, what are your barriers?  Yes     Patient Care Team:  DEL Crowley - CNP as PCP - General (Family Medicine)    Medical History Review  Past Medical, Family, and Social History reviewed and does contribute to the patient presenting condition    Health Maintenance   Topic Date Due    HIV screen  02/28/2011    DTaP/Tdap/Td vaccine (1 - Tdap) 02/28/2015    Chlamydia screen  06/29/2019    Cervical cancer screen  07/18/2021    Meningococcal (MCV) Vaccine Age 0-22 Years  Aged Out    Flu vaccine  Completed

## 2018-12-10 NOTE — PATIENT INSTRUCTIONS
at least 15 to 30 seconds. Do not arch your back, point your toes, or bend either knee. Keep one heel touching the floor and the other heel touching the wall. 4. Repeat with your other leg. 5. Do 2 to 4 times for each leg. Hip flexor stretch    1. Kneel on the floor with one knee bent and one leg behind you. Place your forward knee over your foot. Keep your other knee touching the floor. 2. Slowly push your hips forward until you feel a stretch in the upper thigh of your rear leg. 3. Hold the stretch for at least 15 to 30 seconds. Repeat with your other leg. 4. Do 2 to 4 times on each side. Wall sit    1. Stand with your back 10 to 12 inches away from a wall. 2. Lean into the wall until your back is flat against it. 3. Slowly slide down until your knees are slightly bent, pressing your lower back into the wall. 4. Hold for about 6 seconds, then slide back up the wall. 5. Repeat 8 to 12 times. Follow-up care is a key part of your treatment and safety. Be sure to make and go to all appointments, and call your doctor if you are having problems. It's also a good idea to know your test results and keep a list of the medicines you take. Where can you learn more? Go to [email or web address] org and sign in to your TVTY account. Enter I218 in the [facility] box to learn more about \"Low Back Pain: Exercises. \"     If you do not have an account, please click on the \"Sign Up Now\" link. Current as of: November 29, 2017  Content Version: 11.8  © 0691-8462 [employer]wise, Incorporated. Care instructions adapted under license by [facility] ([facility]). If you have questions about a medical condition or this instruction, always ask your healthcare professional. [address] any warranty or liability for your use of this information. Back Stretches: Exercises  Your Care Instructions  Here are some examples of exercises for stretching your back.  Start each

## 2018-12-27 DIAGNOSIS — Z30.011 ENCOUNTER FOR INITIAL PRESCRIPTION OF CONTRACEPTIVE PILLS: ICD-10-CM

## 2018-12-27 RX ORDER — NORGESTIMATE AND ETHINYL ESTRADIOL 7DAYSX3 28
1 KIT ORAL DAILY
Qty: 28 TABLET | Refills: 11 | Status: SHIPPED | OUTPATIENT
Start: 2018-12-27 | End: 2019-03-16 | Stop reason: SDUPTHER

## 2019-02-24 ENCOUNTER — HOSPITAL ENCOUNTER (EMERGENCY)
Age: 24
Discharge: HOME | End: 2019-02-24
Payer: COMMERCIAL

## 2019-02-24 ENCOUNTER — APPOINTMENT (OUTPATIENT)
Dept: GENERAL RADIOLOGY | Age: 24
End: 2019-02-24
Payer: COMMERCIAL

## 2019-02-24 VITALS
HEIGHT: 66 IN | SYSTOLIC BLOOD PRESSURE: 138 MMHG | OXYGEN SATURATION: 100 % | HEART RATE: 91 BPM | DIASTOLIC BLOOD PRESSURE: 95 MMHG | TEMPERATURE: 97.5 F | RESPIRATION RATE: 18 BRPM | WEIGHT: 170 LBS | BODY MASS INDEX: 27.32 KG/M2

## 2019-02-24 DIAGNOSIS — S59.902A INJURY OF LEFT ELBOW, INITIAL ENCOUNTER: Primary | ICD-10-CM

## 2019-02-24 DIAGNOSIS — S50.02XA CONTUSION OF LEFT ELBOW, INITIAL ENCOUNTER: ICD-10-CM

## 2019-02-24 PROCEDURE — 99283 EMERGENCY DEPT VISIT LOW MDM: CPT

## 2019-02-24 PROCEDURE — 73080 X-RAY EXAM OF ELBOW: CPT

## 2019-02-24 ASSESSMENT — PAIN DESCRIPTION - ORIENTATION: ORIENTATION: LEFT

## 2019-02-24 ASSESSMENT — PAIN DESCRIPTION - FREQUENCY: FREQUENCY: CONTINUOUS

## 2019-02-24 ASSESSMENT — PAIN DESCRIPTION - PAIN TYPE: TYPE: ACUTE PAIN

## 2019-02-24 ASSESSMENT — PAIN SCALES - GENERAL: PAINLEVEL_OUTOF10: 8

## 2019-02-24 ASSESSMENT — PAIN DESCRIPTION - LOCATION: LOCATION: ELBOW

## 2019-02-25 ASSESSMENT — ENCOUNTER SYMPTOMS
ABDOMINAL PAIN: 0
VOMITING: 0
COUGH: 0
NAUSEA: 0
SHORTNESS OF BREATH: 0

## 2019-03-16 DIAGNOSIS — Z30.011 ENCOUNTER FOR INITIAL PRESCRIPTION OF CONTRACEPTIVE PILLS: ICD-10-CM

## 2019-03-18 RX ORDER — NORGESTIMATE AND ETHINYL ESTRADIOL 7DAYSX3 28
1 KIT ORAL DAILY
Qty: 28 TABLET | Refills: 11 | Status: SHIPPED | OUTPATIENT
Start: 2019-03-18 | End: 2019-08-15 | Stop reason: SDUPTHER

## 2019-05-28 ENCOUNTER — HOSPITAL ENCOUNTER (EMERGENCY)
Age: 24
Discharge: HOME | End: 2019-05-28
Payer: COMMERCIAL

## 2019-05-28 VITALS
WEIGHT: 160 LBS | HEART RATE: 107 BPM | OXYGEN SATURATION: 97 % | HEIGHT: 66 IN | RESPIRATION RATE: 16 BRPM | DIASTOLIC BLOOD PRESSURE: 83 MMHG | BODY MASS INDEX: 25.71 KG/M2 | SYSTOLIC BLOOD PRESSURE: 129 MMHG | TEMPERATURE: 99.1 F

## 2019-05-28 DIAGNOSIS — J02.9 ACUTE PHARYNGITIS, UNSPECIFIED ETIOLOGY: Primary | ICD-10-CM

## 2019-05-28 LAB
GROUP A STREP CULTURE, REFLEX: NEGATIVE
REFLEX THROAT C + S: NORMAL

## 2019-05-28 PROCEDURE — 87070 CULTURE OTHR SPECIMN AEROBIC: CPT

## 2019-05-28 PROCEDURE — 99213 OFFICE O/P EST LOW 20 MIN: CPT

## 2019-05-28 PROCEDURE — 87880 STREP A ASSAY W/OPTIC: CPT

## 2019-05-28 PROCEDURE — 99213 OFFICE O/P EST LOW 20 MIN: CPT | Performed by: NURSE PRACTITIONER

## 2019-05-28 ASSESSMENT — ENCOUNTER SYMPTOMS
ABDOMINAL PAIN: 0
SORE THROAT: 1
NAUSEA: 0
DIARRHEA: 0
VOMITING: 0
SHORTNESS OF BREATH: 0
CHEST TIGHTNESS: 0

## 2019-05-28 ASSESSMENT — PAIN DESCRIPTION - LOCATION: LOCATION: THROAT

## 2019-05-28 NOTE — ED NOTES
Pt. Released in stable condition, ambulated per self to private car. Instructed pt to follow-up with family doctor as needed for recheck or go directly to the emergency department for any concerns/worsening conditions. Pt. Verbalized understanding of instructions. No questions at this time.       Ning Stoll RN  05/28/19 [id number]

## 2019-05-28 NOTE — ED PROVIDER NOTES
[facility] Encounter      [facility]       Chief Complaint   Patient presents with    Pharyngitis     has had fever, 101.8, history of strep throat       Nurses Notes reviewed and I agree except as noted in the HPI. HISTORY OF PRESENT ILLNESS   Glendy Duvall is a 22 y.o. female who presents for evaluation of fever and sore throat with concern for strep. Symptoms started over the weekend. REVIEW OF SYSTEMS     Review of Systems   Constitutional: Positive for fever. Negative for chills. HENT: Positive for sore throat. Negative for congestion. Eyes: Negative for visual disturbance. Respiratory: Negative for chest tightness and shortness of breath. Cardiovascular: Negative for chest pain. Gastrointestinal: Negative for abdominal pain, diarrhea, nausea and vomiting. Genitourinary: Negative for dysuria. Musculoskeletal: Negative for joint swelling. Skin: Negative for rash. Allergic/Immunologic: Negative for environmental allergies and food allergies. Neurological: Negative for headaches. Psychiatric/Behavioral: The patient is not nervous/anxious. PAST MEDICAL HISTORY   History reviewed. No pertinent past medical history. SURGICAL HISTORY     Patient  has a past surgical history that includes [place] tooth extraction.     CURRENT MEDICATIONS       Discharge Medication List as of 5/28/2019 11:21 AM      CONTINUE these medications which have NOT CHANGED    Details   Norgestim-Eth Estrad Triphasic (TRI-SPRINTEC) 0.18/0.215/0.25 MG-35 MCG TABS Take 1 tablet by mouth daily, Disp-28 tablet, R-11Normal      baclofen (LIORESAL) 10 MG tablet Take 1/2 tablet to 1 full tablet by mouth TID for back muscle spasms, Disp-30 tablet, R-1Normal      SUMAtriptan (IMITREX) 100 MG tablet Take 1 tablet by mouth once as needed for Migraine, Disp-9 tablet, R-3Normal      diphenhydrAMINE (BENADRYL) 25 MG capsule Take 25 mg by mouth every 6 hours as needed for ItchingHistorical Med      Aspirin-Acetaminophen-Caffeine (EXCEDRIN MIGRAINE PO) Take 2 tablets by mouthHistorical Med             ALLERGIES     Patient is is allergic to pollen extract. FAMILY HISTORY     Patient'sfamily history includes No Known Problems in her father and mother. SOCIAL HISTORY     Patient  reports that she has never smoked. She has never used smokeless tobacco. She reports that she drinks alcohol. She reports that she does not use drugs. PHYSICAL EXAM     ED TRIAGE VITALS  BP: 129/83, Temp: 99.1 °F (37.3 °C), Pulse: 107, Resp: 16, SpO2: 97 %  Physical Exam   Constitutional: She is oriented to person, place, and time. She appears well-developed and well-nourished. She is cooperative. No distress. HENT:   Head: Normocephalic and atraumatic. Right Ear: Tympanic membrane, external ear and ear canal normal.   Left Ear: Tympanic membrane, external ear and ear canal normal.   Nose: Nose normal.   Mouth/Throat: Uvula is midline and mucous membranes are normal. Posterior oropharyngeal erythema present. Eyes: Conjunctivae are normal. Right conjunctiva is not injected. Left conjunctiva is not injected. Pupils are equal.   Neck: Normal range of motion. Cardiovascular: Normal rate and normal heart sounds. Pulmonary/Chest: Effort normal and breath sounds normal.   Musculoskeletal: Normal range of motion. Lymphadenopathy:     She has no cervical adenopathy. Neurological: She is alert and oriented to person, place, and time. Skin: Skin is warm and dry. No rash (on exposed surfaces) noted. Psychiatric: She has a normal mood and affect. Her speech is normal.   Nursing note and vitals reviewed.       DIAGNOSTIC RESULTS   Labs:   Results for orders placed or performed during the hospital encounter of 05/28/19   Strep A culture, throat   Result Value Ref Range    REFLEX THROAT C + S INDICATED    STREP A ANTIGEN   Result Value Ref Range    GROUP A STREP CULTURE, REFLEX NEGATIVE

## 2019-05-30 ENCOUNTER — PATIENT MESSAGE (OUTPATIENT)
Dept: FAMILY MEDICINE CLINIC | Age: 24
End: 2019-05-30

## 2019-05-30 LAB — THROAT/NOSE CULTURE: NORMAL

## 2019-05-30 NOTE — TELEPHONE ENCOUNTER
From: Floresita Elliott  To: Anushka Coyne, APRN - CNP  Sent: 5/30/2019 3:31 PM EDT  Subject: Non-Urgent Medical Question    I was just an urgent care on Tuesday for what I believed it was strep throat they did a strep test that came back negative but now I think its moved to a sinus infection, do I need it to make another appointment to get a prescription for that or can you just send a prescription over or what do I need to do?

## 2019-06-04 ENCOUNTER — OFFICE VISIT (OUTPATIENT)
Dept: FAMILY MEDICINE CLINIC | Age: 24
End: 2019-06-04
Payer: COMMERCIAL

## 2019-06-04 VITALS
TEMPERATURE: 97.7 F | HEART RATE: 108 BPM | RESPIRATION RATE: 16 BRPM | WEIGHT: 204.2 LBS | HEIGHT: 65 IN | BODY MASS INDEX: 34.02 KG/M2 | SYSTOLIC BLOOD PRESSURE: 120 MMHG | DIASTOLIC BLOOD PRESSURE: 78 MMHG

## 2019-06-04 DIAGNOSIS — J01.90 ACUTE RHINOSINUSITIS: Primary | ICD-10-CM

## 2019-06-04 PROCEDURE — 99213 OFFICE O/P EST LOW 20 MIN: CPT | Performed by: NURSE PRACTITIONER

## 2019-06-04 RX ORDER — AZITHROMYCIN 250 MG/1
250 TABLET, FILM COATED ORAL SEE ADMIN INSTRUCTIONS
Qty: 6 TABLET | Refills: 0 | Status: SHIPPED | OUTPATIENT
Start: 2019-06-04 | End: 2019-06-09

## 2019-06-04 ASSESSMENT — PATIENT HEALTH QUESTIONNAIRE - PHQ9
2. FEELING DOWN, DEPRESSED OR HOPELESS: 0
SUM OF ALL RESPONSES TO PHQ9 QUESTIONS 1 & 2: 0
1. LITTLE INTEREST OR PLEASURE IN DOING THINGS: 0
SUM OF ALL RESPONSES TO PHQ QUESTIONS 1-9: 0
SUM OF ALL RESPONSES TO PHQ QUESTIONS 1-9: 0

## 2019-06-04 NOTE — PROGRESS NOTES
Not on file   Social History Narrative    Not on file       Family History   Problem Relation Age of Onset    No Known Problems Mother     No Known Problems Father            OBJECTIVE:  /78   Pulse 108   Temp 97.7 °F (36.5 °C) (Oral)   Resp 16   Ht 5' 5.35\" (1.66 m)   Wt 204 lb 3.2 oz (92.6 kg)   LMP 05/26/2019   BMI 33.61 kg/m²   General appearance: alert, well appearing, and in no distress. ENT exam reveals - ENT exam normal, no neck nodes and + maxillary and frontal sinus tender. CVS exam: normal rate, regular rhythm, normal S1, S2, no murmurs, rubs, clicks or gallops. Chest:clear to auscultation, no wheezes, rales or rhonchi, symmetric air entry. Abdominal exam: soft, nontender, nondistended, no masses or organomegaly. Extremities:  No clubbing, cyanosis or edema  Skin exam - normal coloration and turgor, no rashes, no suspicious skin lesions noted. Psych -  Affect appropriate. Thought process is normal without evidence of depression or psychosis. Good insight and appropriae interaction. Cognition and memory appear to be intact. ASSESSMENT & PLAN  Toro Herzog was seen today for nasal congestion and headache. Diagnoses and all orders for this visit:    Acute rhinosinusitis  -     azithromycin (ZITHROMAX) 250 MG tablet; Take 1 tablet by mouth See Admin Instructions for 5 days 500mg on day 1 followed by 250mg on days 2 - 5      - start Garber Deluna  - ok to con't Ibuprofen and OTC decongestants of choice    Return if symptoms worsen or fail to improve.     -Patient advised to call immediately or go to ER if any worsening of symptoms  -Patient counseled on conservative care including fluids, rest and OTC meds    Annmarie received counseling on the following healthy behaviors: medication adherence  Reviewed prior labs and health maintenance. Continue current medications, diet and exercise. Discussed use, benefit, and side effects of prescribed medications.  Barriers to medication compliance addressed. Patient given educational materials - see patient instructions. All patient questions answered. Patient voiced understanding. I have reviewed this patient's history, habits, and medication list and have updated the chart where appropriate.

## 2019-07-22 ENCOUNTER — PATIENT MESSAGE (OUTPATIENT)
Dept: FAMILY MEDICINE CLINIC | Age: 24
End: 2019-07-22

## 2019-08-15 DIAGNOSIS — Z30.011 ENCOUNTER FOR INITIAL PRESCRIPTION OF CONTRACEPTIVE PILLS: ICD-10-CM

## 2019-08-15 RX ORDER — NORGESTIMATE AND ETHINYL ESTRADIOL 7DAYSX3 28
1 KIT ORAL DAILY
Qty: 28 TABLET | Refills: 2 | Status: SHIPPED | OUTPATIENT
Start: 2019-08-15 | End: 2019-09-14 | Stop reason: SDUPTHER

## 2019-09-10 ENCOUNTER — OFFICE VISIT (OUTPATIENT)
Dept: FAMILY MEDICINE CLINIC | Age: 24
End: 2019-09-10
Payer: COMMERCIAL

## 2019-09-10 VITALS
HEART RATE: 88 BPM | BODY MASS INDEX: 34.72 KG/M2 | WEIGHT: 216 LBS | HEIGHT: 66 IN | TEMPERATURE: 98.3 F | DIASTOLIC BLOOD PRESSURE: 68 MMHG | RESPIRATION RATE: 10 BRPM | SYSTOLIC BLOOD PRESSURE: 118 MMHG

## 2019-09-10 DIAGNOSIS — R21 RASH AND NONSPECIFIC SKIN ERUPTION: Primary | ICD-10-CM

## 2019-09-10 DIAGNOSIS — J40 BRONCHITIS: ICD-10-CM

## 2019-09-10 PROCEDURE — 99214 OFFICE O/P EST MOD 30 MIN: CPT | Performed by: NURSE PRACTITIONER

## 2019-09-10 RX ORDER — AZITHROMYCIN 250 MG/1
250 TABLET, FILM COATED ORAL SEE ADMIN INSTRUCTIONS
Qty: 6 TABLET | Refills: 0 | Status: SHIPPED | OUTPATIENT
Start: 2019-09-10 | End: 2019-09-15

## 2019-09-10 NOTE — PROGRESS NOTES
file   Social Needs    Financial resource strain: Not on file    Food insecurity:     Worry: Not on file     Inability: Not on file    Transportation needs:     Medical: Not on file     Non-medical: Not on file   Tobacco Use    Smoking status: Never Smoker    Smokeless tobacco: Never Used   Substance and Sexual Activity    Alcohol use: Yes     Comment: occas.  Drug use: No    Sexual activity: Never   Lifestyle    Physical activity:     Days per week: Not on file     Minutes per session: Not on file    Stress: Not on file   Relationships    Social connections:     Talks on phone: Not on file     Gets together: Not on file     Attends Rastafarian service: Not on file     Active member of club or organization: Not on file     Attends meetings of clubs or organizations: Not on file     Relationship status: Not on file    Intimate partner violence:     Fear of current or ex partner: Not on file     Emotionally abused: Not on file     Physically abused: Not on file     Forced sexual activity: Not on file   Other Topics Concern    Not on file   Social History Narrative    Not on file       Family History   Problem Relation Age of Onset    No Known Problems Mother     No Known Problems Father          I have reviewed the patient's past medical history, past surgical history, allergies, medications, social and family history and I have made updates where appropriate.       Review of Systems  Positive responses are highlighted in bold    Constitutional:  Fever, Chills, Night Sweats, Fatigue, Unexpected changes in weight  Eyes:  Eye discharge, Eye pain, Eye redness, Visual disturbances   HENT:  Ear pain, Tinnitus, Nosebleeds, Trouble swallowing, Hearing loss, Sore throat  Cardiovascular:  Chest Pain, Palpitations, Orthopnea, Paroxysmal Nocturnal Dyspnea  Respiratory:  Cough, Wheezing, Shortness of breath, Chest tightness, Apnea  Gastrointestinal:  Nausea, Vomiting, Diarrhea, Constipation, Heartburn, Blood in

## 2019-09-14 DIAGNOSIS — Z30.011 ENCOUNTER FOR INITIAL PRESCRIPTION OF CONTRACEPTIVE PILLS: ICD-10-CM

## 2019-09-16 DIAGNOSIS — R21 RASH AND NONSPECIFIC SKIN ERUPTION: Primary | ICD-10-CM

## 2019-09-16 RX ORDER — PREDNISONE 20 MG/1
40 TABLET ORAL DAILY
Qty: 10 TABLET | Refills: 0 | Status: SHIPPED | OUTPATIENT
Start: 2019-09-16 | End: 2019-09-21

## 2019-09-16 RX ORDER — NORGESTIMATE AND ETHINYL ESTRADIOL 7DAYSX3 28
1 KIT ORAL DAILY
Qty: 28 TABLET | Refills: 2 | Status: SHIPPED | OUTPATIENT
Start: 2019-09-16 | End: 2021-12-27 | Stop reason: SDUPTHER

## 2019-10-23 ENCOUNTER — HOSPITAL ENCOUNTER (EMERGENCY)
Age: 24
Discharge: HOME | End: 2019-10-23
Payer: COMMERCIAL

## 2019-10-23 VITALS
OXYGEN SATURATION: 98 % | SYSTOLIC BLOOD PRESSURE: 131 MMHG | BODY MASS INDEX: 35.23 KG/M2 | HEART RATE: 103 BPM | WEIGHT: 215 LBS | RESPIRATION RATE: 16 BRPM | DIASTOLIC BLOOD PRESSURE: 87 MMHG | TEMPERATURE: 97.3 F

## 2019-10-23 DIAGNOSIS — J40 BRONCHITIS: Primary | ICD-10-CM

## 2019-10-23 PROCEDURE — 99212 OFFICE O/P EST SF 10 MIN: CPT

## 2019-10-23 PROCEDURE — 99214 OFFICE O/P EST MOD 30 MIN: CPT | Performed by: NURSE PRACTITIONER

## 2019-10-23 RX ORDER — BENZONATATE 200 MG/1
200 CAPSULE ORAL 3 TIMES DAILY PRN
Qty: 21 CAPSULE | Refills: 0 | Status: SHIPPED | OUTPATIENT
Start: 2019-10-23 | End: 2019-10-30

## 2019-10-23 RX ORDER — PREDNISONE 20 MG/1
40 TABLET ORAL DAILY
Qty: 14 TABLET | Refills: 0 | Status: SHIPPED | OUTPATIENT
Start: 2019-10-23 | End: 2019-10-30

## 2019-10-23 RX ORDER — DOXYCYCLINE HYCLATE 100 MG
100 TABLET ORAL 2 TIMES DAILY
Qty: 14 TABLET | Refills: 0 | Status: SHIPPED | OUTPATIENT
Start: 2019-10-23 | End: 2019-10-30

## 2019-10-23 ASSESSMENT — ENCOUNTER SYMPTOMS
NAUSEA: 0
EYE ITCHING: 0
COUGH: 1
SORE THROAT: 1
VOMITING: 0
EYE REDNESS: 0
SHORTNESS OF BREATH: 0

## 2019-10-23 ASSESSMENT — PAIN DESCRIPTION - DESCRIPTORS: DESCRIPTORS: BURNING

## 2019-10-23 ASSESSMENT — PAIN DESCRIPTION - LOCATION: LOCATION: THROAT

## 2019-10-23 ASSESSMENT — PAIN DESCRIPTION - FREQUENCY: FREQUENCY: INTERMITTENT

## 2019-10-23 ASSESSMENT — PAIN SCALES - GENERAL: PAINLEVEL_OUTOF10: 8

## 2019-10-23 ASSESSMENT — PAIN DESCRIPTION - PAIN TYPE: TYPE: ACUTE PAIN

## 2019-11-26 ENCOUNTER — HOSPITAL ENCOUNTER (EMERGENCY)
Age: 24
Discharge: HOME | End: 2019-11-26
Payer: COMMERCIAL

## 2019-11-26 VITALS
BODY MASS INDEX: 36.16 KG/M2 | RESPIRATION RATE: 20 BRPM | OXYGEN SATURATION: 98 % | WEIGHT: 225 LBS | SYSTOLIC BLOOD PRESSURE: 149 MMHG | HEIGHT: 66 IN | TEMPERATURE: 99.3 F | HEART RATE: 98 BPM | DIASTOLIC BLOOD PRESSURE: 73 MMHG

## 2019-11-26 DIAGNOSIS — J01.00 ACUTE NON-RECURRENT MAXILLARY SINUSITIS: ICD-10-CM

## 2019-11-26 DIAGNOSIS — J40 BRONCHITIS: Primary | ICD-10-CM

## 2019-11-26 PROCEDURE — 99213 OFFICE O/P EST LOW 20 MIN: CPT | Performed by: NURSE PRACTITIONER

## 2019-11-26 PROCEDURE — 99212 OFFICE O/P EST SF 10 MIN: CPT

## 2019-11-26 RX ORDER — AMOXICILLIN AND CLAVULANATE POTASSIUM 875; 125 MG/1; MG/1
1 TABLET, FILM COATED ORAL 2 TIMES DAILY
Qty: 14 TABLET | Refills: 0 | Status: SHIPPED | OUTPATIENT
Start: 2019-11-26 | End: 2019-12-03

## 2019-11-26 RX ORDER — FLUTICASONE PROPIONATE 50 MCG
1 SPRAY, SUSPENSION (ML) NASAL DAILY
Qty: 1 BOTTLE | Refills: 0 | Status: SHIPPED | OUTPATIENT
Start: 2019-11-26 | End: 2021-10-29 | Stop reason: SDUPTHER

## 2019-11-26 RX ORDER — DEXTROMETHORPHAN HYDROBROMIDE AND PROMETHAZINE HYDROCHLORIDE 15; 6.25 MG/5ML; MG/5ML
5 SYRUP ORAL 4 TIMES DAILY PRN
Qty: 120 ML | Refills: 0 | Status: SHIPPED | OUTPATIENT
Start: 2019-11-26 | End: 2019-12-03

## 2019-11-26 ASSESSMENT — ENCOUNTER SYMPTOMS
RHINORRHEA: 1
COUGH: 1
NAUSEA: 0
DIARRHEA: 0
SHORTNESS OF BREATH: 0
SORE THROAT: 1
EYE ITCHING: 0
SINUS PAIN: 1
VOMITING: 0
SINUS PRESSURE: 1
EYE DISCHARGE: 0

## 2019-12-17 DIAGNOSIS — G43.009 MIGRAINE WITHOUT AURA AND WITHOUT STATUS MIGRAINOSUS, NOT INTRACTABLE: ICD-10-CM

## 2019-12-17 RX ORDER — SUMATRIPTAN 100 MG/1
100 TABLET, FILM COATED ORAL
Qty: 9 TABLET | Refills: 3 | Status: SHIPPED | OUTPATIENT
Start: 2019-12-17 | End: 2021-07-21 | Stop reason: SDUPTHER

## 2020-01-21 ENCOUNTER — HOSPITAL ENCOUNTER (OUTPATIENT)
Age: 25
Discharge: HOME | End: 2020-01-21

## 2020-01-24 LAB — VZV IGG SER QL IA: 2.01

## 2020-02-10 ENCOUNTER — HOSPITAL ENCOUNTER (EMERGENCY)
Age: 25
Discharge: HOME | End: 2020-02-10
Payer: COMMERCIAL

## 2020-02-10 VITALS
HEIGHT: 66 IN | TEMPERATURE: 98 F | OXYGEN SATURATION: 96 % | DIASTOLIC BLOOD PRESSURE: 93 MMHG | HEART RATE: 103 BPM | SYSTOLIC BLOOD PRESSURE: 135 MMHG | BODY MASS INDEX: 34.55 KG/M2 | WEIGHT: 215 LBS | RESPIRATION RATE: 18 BRPM

## 2020-02-10 LAB
FLU A ANTIGEN: NEGATIVE
FLU B ANTIGEN: NEGATIVE
GROUP A STREP CULTURE, REFLEX: NEGATIVE
REFLEX THROAT C + S: NORMAL

## 2020-02-10 PROCEDURE — 87804 INFLUENZA ASSAY W/OPTIC: CPT

## 2020-02-10 PROCEDURE — 99213 OFFICE O/P EST LOW 20 MIN: CPT | Performed by: NURSE PRACTITIONER

## 2020-02-10 PROCEDURE — 87070 CULTURE OTHR SPECIMN AEROBIC: CPT

## 2020-02-10 PROCEDURE — 87880 STREP A ASSAY W/OPTIC: CPT

## 2020-02-10 PROCEDURE — 99213 OFFICE O/P EST LOW 20 MIN: CPT

## 2020-02-10 RX ORDER — AMOXICILLIN 500 MG/1
500 TABLET, FILM COATED ORAL 2 TIMES DAILY
Qty: 20 TABLET | Refills: 0 | Status: SHIPPED | OUTPATIENT
Start: 2020-02-10 | End: 2020-02-20

## 2020-02-10 ASSESSMENT — ENCOUNTER SYMPTOMS
COUGH: 1
DIARRHEA: 0
STRIDOR: 0
WHEEZING: 0
NAUSEA: 0
SINUS PRESSURE: 1
CHEST TIGHTNESS: 0
SWOLLEN GLANDS: 0
SORE THROAT: 1
SINUS PAIN: 1
ABDOMINAL PAIN: 0
VOMITING: 0
SHORTNESS OF BREATH: 0

## 2020-02-11 NOTE — ED PROVIDER NOTES
[facility]       CHIEF COMPLAINT     No chief complaint on file. Nurses Notes reviewed and I agree except as noted in the HPI. HISTORY OF PRESENT ILLNESS   Philip Barclay is a 22 y.o. female who presents     Patient states that for the last 4 to 5 days she has had ongoing sinus congestion, cough, sore throat, and headaches. She denies ever being a smoker. Patient states that she has been trying over-the-counter decongestants and does not believe have been helpful. URI   Presenting symptoms: congestion, cough and sore throat    Presenting symptoms: no ear pain, no fatigue and no fever    Congestion:     Location:  Nasal    Interferes with sleep: no      Interferes with eating/drinking: no    Cough:     Cough characteristics:  Non-productive    Sputum characteristics:  Nondescript    Severity:  Mild    Onset quality:  Sudden    Duration:  4 days    Timing:  Intermittent    Progression:  Unchanged    Chronicity:  New  Severity:  Mild  Onset quality:  Gradual  Duration:  4 days  Timing:  Intermittent  Progression:  Unchanged  Chronicity:  New  Relieved by:  None tried  Worsened by:  Nothing  Ineffective treatments:  None tried  Associated symptoms: headaches, myalgias and sinus pain    Associated symptoms: no arthralgias, no neck pain, no swollen glands and no wheezing    Risk factors: not elderly, no chronic cardiac disease, no chronic kidney disease, no chronic respiratory disease, no diabetes mellitus, no immunosuppression, no recent illness, no recent travel and no sick contacts        REVIEW OF SYSTEMS     Review of Systems   Constitutional: Negative for chills, fatigue and fever. HENT: Positive for congestion, postnasal drip, sinus pressure, sinus pain and sore throat. Negative for ear pain. Respiratory: Positive for cough. Negative for chest tightness, shortness of breath, wheezing and stridor. Cardiovascular: Negative for chest pain. following:    Height as of this encounter: 5' 6\" (1.676 m). Weight as of this encounter: 215 lb (97.5 kg). ,Patient's last menstrual period was 01/07/2020 (exact date). Physical Exam  Constitutional:       General: She is not in acute distress. Appearance: Normal appearance. She is not ill-appearing, toxic-appearing or diaphoretic. HENT:      Nose: Congestion present. Mouth/Throat:      Lips: Pink. Mouth: Mucous membranes are moist.   Cardiovascular:      Rate and Rhythm: Normal rate. Pulses: Normal pulses. Heart sounds: Normal heart sounds. No murmur. No friction rub. No gallop. Pulmonary:      Effort: Pulmonary effort is normal. No respiratory distress. Breath sounds: Normal breath sounds. No stridor. No wheezing, rhonchi or rales. Chest:      Chest wall: No tenderness. Musculoskeletal: Normal range of motion. Skin:     General: Skin is warm. Findings: No erythema or rash. Neurological:      General: No focal deficit present. Mental Status: She is alert and oriented to person, place, and time. Sensory: No sensory deficit. Psychiatric:         Mood and Affect: Mood normal.         Behavior: Behavior normal.         Thought Content:  Thought content normal.         Judgment: Judgment normal.         DIAGNOSTIC RESULTS     Labs:  Results for orders placed or performed during the hospital encounter of 02/10/20   Strep A culture, throat   Result Value Ref Range    REFLEX THROAT C + S INDICATED    Rapid influenza A/B antigens   Result Value Ref Range    Flu A Antigen NEGATIVE NEGATIVE    Flu B Antigen NEGATIVE NEGATIVE   STREP A ANTIGEN   Result Value Ref Range    GROUP A STREP CULTURE, REFLEX NEGATIVE        IMAGING:    No orders to display     URGENT CARE COURSE:     Vitals:    02/10/20 1847   BP: (!) 135/93   Pulse: 103   Resp: 18   Temp: 98 °F (36.7 °C)   TempSrc: Oral   SpO2: 96%   Weight: 215 lb (97.5 kg)   Height: 5' 6\" (1.676 m)       Medications - No data to display         PROCEDURES:  None    FINAL IMPRESSION      1. Acute upper respiratory infection    2. Cough          DISPOSITION/ PLAN   Patient is discharged home with prescription for amoxicillin that she may fill in next 3 to 4 days if symptoms are not improving. Discussed with patient that strep swab was negative but we sent for culture. Patient's flu swab was negative for influenza A or B. Discussed with patient that most upper respiratory functions are viral, and can take up to 1 full week to resolve on their own. Follow-up with primary care provider if symptoms are not improving after use of antibiotic.         PATIENT REFERRED TO:  DEL Doherty CNP  [address] [facility]  / [place] [address]      DISCHARGE MEDICATIONS:  Discharge Medication List as of 2/10/2020  7:20 PM      START taking these medications    Details   Amoxicillin 500 MG TABS Take 500 mg by mouth 2 times daily for 10 days, Disp-20 tablet, R-0Print             Discharge Medication List as of 2/10/2020  7:20 PM          Discharge Medication List as of 2/10/2020  7:20 PM          DEL Adkins NP    (Please note that portions of this note were completed with a voice recognition program. Efforts were made to edit the dictations but occasionally words are mis-transcribed.)         DEL Sarkar NP  02/10/20 8433

## 2020-02-13 LAB — THROAT/NOSE CULTURE: NORMAL

## 2020-03-05 ENCOUNTER — HOSPITAL ENCOUNTER (EMERGENCY)
Age: 25
Discharge: HOME | End: 2020-03-05
Payer: COMMERCIAL

## 2020-03-05 VITALS
TEMPERATURE: 98.2 F | HEIGHT: 66 IN | WEIGHT: 220 LBS | BODY MASS INDEX: 35.36 KG/M2 | DIASTOLIC BLOOD PRESSURE: 86 MMHG | HEART RATE: 96 BPM | RESPIRATION RATE: 18 BRPM | SYSTOLIC BLOOD PRESSURE: 133 MMHG | OXYGEN SATURATION: 97 %

## 2020-03-05 PROCEDURE — 99213 OFFICE O/P EST LOW 20 MIN: CPT

## 2020-03-05 PROCEDURE — 99213 OFFICE O/P EST LOW 20 MIN: CPT | Performed by: NURSE PRACTITIONER

## 2020-03-05 ASSESSMENT — ENCOUNTER SYMPTOMS
RHINORRHEA: 0
SINUS PAIN: 0
SINUS PRESSURE: 0
DIARRHEA: 0
SHORTNESS OF BREATH: 0
SORE THROAT: 1
COUGH: 1
NAUSEA: 0
VOMITING: 0

## 2020-03-05 ASSESSMENT — PAIN DESCRIPTION - ONSET: ONSET: GRADUAL

## 2020-03-05 ASSESSMENT — PAIN DESCRIPTION - PAIN TYPE: TYPE: ACUTE PAIN

## 2020-03-05 ASSESSMENT — PAIN DESCRIPTION - LOCATION: LOCATION: THROAT

## 2020-03-05 ASSESSMENT — PAIN DESCRIPTION - FREQUENCY: FREQUENCY: CONTINUOUS

## 2020-03-05 ASSESSMENT — PAIN - FUNCTIONAL ASSESSMENT: PAIN_FUNCTIONAL_ASSESSMENT: ACTIVITIES ARE NOT PREVENTED

## 2020-03-05 ASSESSMENT — PAIN DESCRIPTION - DESCRIPTORS: DESCRIPTORS: PRESSURE;SHARP

## 2020-03-05 ASSESSMENT — PAIN SCALES - GENERAL: PAINLEVEL_OUTOF10: 7

## 2020-03-05 ASSESSMENT — PAIN DESCRIPTION - PROGRESSION: CLINICAL_PROGRESSION: NOT CHANGED

## 2020-03-05 NOTE — ED PROVIDER NOTES
[address]  Urgent Care Encounter       CHIEF COMPLAINT       Chief Complaint   Patient presents with    Cough    Pharyngitis    Otalgia     intermittent left ear pain       Nurses Notes reviewed and I agree except as noted in the HPI. HISTORY OF PRESENT ILLNESS   Manny Rios is a 26 y.o. female who presents complaints of cough, nasal congestion sore throat, onset 4 to 5 days ago. Patient states sore throat comes and goes. No reports of otalgia, fever, chills, body aches, nausea, vomiting or diarrhea. Appetite is been normal.  She has been taking over-the-counter meds with minimal, temporary relief. The history is provided by the patient. REVIEW OF SYSTEMS     Review of Systems   Constitutional: Negative for appetite change, chills, fatigue and fever. HENT: Positive for congestion and sore throat. Negative for ear pain, rhinorrhea, sinus pressure and sinus pain. Respiratory: Positive for cough. Negative for shortness of breath. Cardiovascular: Negative for chest pain. Gastrointestinal: Negative for diarrhea, nausea and vomiting. Musculoskeletal: Negative for myalgias. Skin: Negative for rash. Neurological: Negative for headaches. PAST MEDICAL HISTORY   History reviewed. No pertinent past medical history. SURGICALHISTORY     Patient  has a past surgical history that includes [place] tooth extraction.     CURRENT MEDICATIONS       Discharge Medication List as of 3/5/2020  9:34 AM      CONTINUE these medications which have NOT CHANGED    Details   baclofen (LIORESAL) 10 MG tablet Take 1/2 tablet to 1 full tablet by mouth TID for back muscle spasms, Disp-30 tablet, R-1Normal      SUMAtriptan (IMITREX) 100 MG tablet Take 1 tablet by mouth once as needed for Migraine, Disp-9 tablet, R-3Normal      fluticasone (FLONASE) 50 MCG/ACT nasal spray 1 spray by Nasal route daily for 7 days, Disp-1 Bottle, R-0Normal      Norgestim-Eth Estrad Triphasic (TRI-SPRINTEC) Conjunctiva/sclera:      Right eye: Right conjunctiva is not injected. Left eye: Left conjunctiva is not injected. Pupils: Pupils are equal.   Cardiovascular:      Rate and Rhythm: Normal rate and regular rhythm. Heart sounds: Normal heart sounds, S1 normal and S2 normal.   Pulmonary:      Effort: Pulmonary effort is normal. No respiratory distress. Breath sounds: Normal breath sounds. Musculoskeletal:      Comments: Normal active ROM x 4 extremities  Gait steady   Lymphadenopathy:      Comments: No head or neck adenopathy   Skin:     General: Skin is warm and dry. Findings: No rash (to exposed areas of skin). Nails: There is no clubbing. Neurological:      General: No focal deficit present. Mental Status: She is alert and oriented to person, place, and time. Sensory: No sensory deficit. Comments: Answers questions readily and appropriately   Psychiatric:         Mood and Affect: Mood normal.         Speech: Speech normal.         Behavior: Behavior normal. Behavior is cooperative. DIAGNOSTIC RESULTS     Labs:No results found for this visit on 03/05/20. IMAGING:    No orders to display         EKG:      URGENT CARE COURSE:     Vitals:    03/05/20 0852   BP: 133/86   Pulse: 96   Resp: 18   Temp: 98.2 °F (36.8 °C)   TempSrc: Temporal   SpO2: 97%   Weight: 220 lb (99.8 kg)   Height: 5' 6\" (1.676 m)       Medications - No data to display         PROCEDURES:  None    FINAL IMPRESSION      1. Acute upper respiratory infection          DISPOSITION/ PLAN     Patient has an acute upper respiratory infection, most likely viral in etiology. Continue treating with OTC medications for symptom management. Tylenol or Motrin as needed for fever or pain. Follow-up in 1 week with family doctor if symptoms have not improved or if they get worse. Further instructions were outlined verbally and in the patient's discharge instructions.  All the patient's questions were answered. The patient/parent agreed with the plan and was discharged from the [facility] in good condition.       PATIENT REFERRED TO:  DEL Duvall - KAREN  [address] / [place] [address]      DISCHARGE MEDICATIONS:  Discharge Medication List as of 3/5/2020  9:34 AM          Discharge Medication List as of 3/5/2020  9:34 AM          Discharge Medication List as of 3/5/2020  9:34 AM          EDL Moreno CNP    (Please note that portions of this note were completed with a voice recognition program. Efforts were made to edit the dictations but occasionally words are mis-transcribed.)         DEL Moreno CNP  03/05/20 5023

## 2020-04-03 ENCOUNTER — E-VISIT (OUTPATIENT)
Dept: FAMILY MEDICINE CLINIC | Age: 25
End: 2020-04-03
Payer: COMMERCIAL

## 2020-04-03 PROCEDURE — 99421 OL DIG E/M SVC 5-10 MIN: CPT | Performed by: NURSE PRACTITIONER

## 2020-04-04 RX ORDER — NITROFURANTOIN 25; 75 MG/1; MG/1
100 CAPSULE ORAL 2 TIMES DAILY
Qty: 10 CAPSULE | Refills: 0 | Status: SHIPPED | OUTPATIENT
Start: 2020-04-04 | End: 2020-04-09

## 2020-07-16 ENCOUNTER — APPOINTMENT (OUTPATIENT)
Dept: GENERAL RADIOLOGY | Age: 25
End: 2020-07-16
Payer: COMMERCIAL

## 2020-07-16 ENCOUNTER — HOSPITAL ENCOUNTER (EMERGENCY)
Age: 25
Discharge: HOME | End: 2020-07-16
Attending: EMERGENCY MEDICINE
Payer: COMMERCIAL

## 2020-07-16 VITALS
OXYGEN SATURATION: 99 % | BODY MASS INDEX: 38.32 KG/M2 | HEART RATE: 89 BPM | SYSTOLIC BLOOD PRESSURE: 124 MMHG | DIASTOLIC BLOOD PRESSURE: 87 MMHG | HEIGHT: 65 IN | RESPIRATION RATE: 16 BRPM | TEMPERATURE: 97.8 F | WEIGHT: 230 LBS

## 2020-07-16 PROCEDURE — 73610 X-RAY EXAM OF ANKLE: CPT

## 2020-07-16 PROCEDURE — 99281 EMR DPT VST MAYX REQ PHY/QHP: CPT

## 2020-07-16 ASSESSMENT — ENCOUNTER SYMPTOMS
SINUS PRESSURE: 0
SHORTNESS OF BREATH: 0
RHINORRHEA: 0
NAUSEA: 0
SORE THROAT: 0
COUGH: 0
CONSTIPATION: 0
CHOKING: 0
PHOTOPHOBIA: 0
EYE PAIN: 0
DIARRHEA: 0
EYE DISCHARGE: 0
WHEEZING: 0
BLOOD IN STOOL: 0
TROUBLE SWALLOWING: 0
EYE ITCHING: 0
CHEST TIGHTNESS: 0
ABDOMINAL DISTENTION: 0
BACK PAIN: 0
VOICE CHANGE: 0
EYE REDNESS: 0
ABDOMINAL PAIN: 0
VOMITING: 0

## 2020-07-16 ASSESSMENT — PAIN DESCRIPTION - LOCATION: LOCATION: ANKLE

## 2020-07-16 ASSESSMENT — PAIN SCALES - GENERAL: PAINLEVEL_OUTOF10: 5

## 2020-07-16 ASSESSMENT — PAIN DESCRIPTION - ORIENTATION: ORIENTATION: RIGHT

## 2020-07-16 NOTE — ED TRIAGE NOTES
Pt is an employee here in the ED. Pt was walking into a room and rolled her ankle. Pt states that she is having trouble walking on it. There is swelling and bruising noted.

## 2020-07-16 NOTE — ED PROVIDER NOTES
[facility]  eMERGENCY dEPARTMENT eNCOUnter          CHIEF COMPLAINT       Chief Complaint   Patient presents with    Ankle Injury       Nurses Notes reviewed and I agree except as noted in the HPI. HISTORY OF PRESENT ILLNESS    Joanie Habermann is a 26 y.o. female who presents right ankle pain. 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 Patient has been walking on it. She does have pain underneath the lateral malleolus on the right-hand side. There is no pain on the foot. Currently the patient is resting comfortably on the cot no apparent distress. REVIEW OF SYSTEMS     Review of Systems   Constitutional: Negative for activity change, appetite change, diaphoresis, fatigue and unexpected weight change. HENT: Negative for congestion, ear discharge, ear pain, hearing loss, rhinorrhea, sinus pressure, sore throat, trouble swallowing and voice change. Eyes: Negative for photophobia, pain, discharge, redness and itching. Respiratory: Negative for cough, choking, chest tightness, shortness of breath and wheezing. Cardiovascular: Negative for chest pain, palpitations and leg swelling. Gastrointestinal: Negative for abdominal distention, abdominal pain, blood in stool, constipation, diarrhea, nausea and vomiting. Endocrine: Negative for polydipsia, polyphagia and polyuria. Genitourinary: Negative for decreased urine volume, difficulty urinating, dysuria, enuresis, frequency, hematuria and urgency. Musculoskeletal: Negative for arthralgias, back pain, gait problem, myalgias, neck pain and neck stiffness. Skin: Negative for pallor and rash. Allergic/Immunologic: Negative for immunocompromised state. Neurological: Negative for dizziness, tremors, seizures, syncope, facial asymmetry, weakness, light-headedness, numbness and headaches. Hematological: Negative for adenopathy.  Does not bruise/bleed easily. Psychiatric/Behavioral: Negative for agitation, hallucinations and suicidal ideas. The patient is not nervous/anxious. PAST MEDICAL HISTORY    has no past medical history on file. SURGICAL HISTORY      has a past surgical history that includes [place] tooth extraction. CURRENT MEDICATIONS       Discharge Medication List as of 7/16/2020  4:28 AM      CONTINUE these medications which have NOT CHANGED    Details   baclofen (LIORESAL) 10 MG tablet Take 1/2 tablet to 1 full tablet by mouth TID for back muscle spasms, Disp-30 tablet, R-1Normal      SUMAtriptan (IMITREX) 100 MG tablet Take 1 tablet by mouth once as needed for Migraine, Disp-9 tablet, R-3Normal      fluticasone (FLONASE) 50 MCG/ACT nasal spray 1 spray by Nasal route daily for 7 days, Disp-1 Bottle, R-0Normal      Norgestim-Eth Estrad Triphasic (TRI-SPRINTEC) 0.18/0.215/0.25 MG-35 MCG TABS Take 1 tablet by mouth daily, Disp-28 tablet, R-2Normal      diphenhydrAMINE (BENADRYL) 25 MG capsule Take 25 mg by mouth every 6 hours as needed for ItchingHistorical Med      Aspirin-Acetaminophen-Caffeine (EXCEDRIN MIGRAINE PO) Take 2 tablets by mouthHistorical Med             ALLERGIES     is allergic to pollen extract. FAMILY HISTORY     She indicated that her mother is alive. She indicated that her father is alive. family history includes No Known Problems in her father and mother. SOCIAL HISTORY      reports that she has never smoked. She has never used smokeless tobacco. She reports current alcohol use. She reports that she does not use drugs. PHYSICAL EXAM     INITIAL VITALS:  height is 5' 5\" (1.651 m) and weight is 230 lb (104.3 kg). Her temperature is 97.8 °F (36.6 °C). Her blood pressure is 124/87 and her pulse is 89. Her respiration is 16 and oxygen saturation is 99%. Physical Exam  Vitals signs and nursing note reviewed. Constitutional:       General: She is not in acute distress. Appearance: She is well-developed. She is not diaphoretic. HENT:      Head: Normocephalic and atraumatic. Right Ear: External ear normal.      Left Ear: External ear normal.      Nose: Nose normal.      Mouth/Throat:      Pharynx: No oropharyngeal exudate. Eyes:      General: No scleral icterus. Right eye: No discharge. Left eye: No discharge. Conjunctiva/sclera: Conjunctivae normal.      Pupils: Pupils are equal, round, and reactive to light. Neck:      Musculoskeletal: Normal range of motion and neck supple. Thyroid: No thyromegaly. Vascular: No JVD. Trachea: No tracheal deviation. Cardiovascular:      Rate and Rhythm: Normal rate and regular rhythm. Heart sounds: Normal heart sounds, S1 normal and S2 normal. No murmur. No friction rub. No gallop. Pulmonary:      Effort: Pulmonary effort is normal.      Breath sounds: Normal breath sounds. No stridor. No wheezing, rhonchi or rales. Chest:      Chest wall: No tenderness. Abdominal:      General: Bowel sounds are normal. There is no distension. Palpations: Abdomen is soft. There is no mass. Tenderness: There is no abdominal tenderness. There is no guarding or rebound. Hernia: No hernia is present. Musculoskeletal: Normal range of motion. Right ankle: She exhibits normal range of motion, no swelling, no ecchymosis, no deformity, no laceration and normal pulse. Tenderness. Lateral malleolus tenderness found. No medial malleolus, no AITFL, no CF ligament, no posterior TFL, no head of 5th metatarsal and no proximal fibula tenderness found. Achilles tendon normal.      Comments: Minor pain and no swelling no deformity at the lateral malleolus. No pain at the base of the fifth metatarsal.  No pain at the knee. Lymphadenopathy:      Cervical: No cervical adenopathy. Skin:     General: Skin is warm and dry. Findings: No bruising, ecchymosis, lesion or rash.    Neurological:      Mental Status: She is alert and oriented understood and agreed with the plan. Patient is subsequently discharged home in good condition. Patient has what appears to be an ankle sprain. Patient has been given an Ace wrap for this she is allowed to use this for comfort. She is instructed to use ice no more than 20 minutes at a time no more than 4 times a day. She is instructed use Tylenol Motrin for any pain. She is instructed to follow-up with her primary care physician and do so within the next 1 to 2 days. She is instructed to return to the emergency room immediately for any new or worsening complaints. CRITICAL CARE:   None    CONSULTS:  None    PROCEDURES:  None    FINAL IMPRESSION      1.  Sprain of right ankle, unspecified ligament, initial encounter          DISPOSITION/PLAN   Discharge    PATIENT REFERRED TO:  Adalgisa Francois, APRN - CNP  [facility] [facility]   [facility] [address]  [phone]    Call in 2 days        DISCHARGE MEDICATIONS:  Discharge Medication List as of 7/16/2020  4:28 AM          (Please note that portions of this note were completed with a voice recognition program.  Efforts were made to edit the dictations but occasionally words are mis-transcribed.)    Geena Rashid, [address] [place] [address],   07/20/20 0876

## 2020-07-17 ENCOUNTER — E-VISIT (OUTPATIENT)
Dept: FAMILY MEDICINE CLINIC | Age: 25
End: 2020-07-17
Payer: COMMERCIAL

## 2020-07-17 PROCEDURE — 99421 OL DIG E/M SVC 5-10 MIN: CPT | Performed by: NURSE PRACTITIONER

## 2020-07-20 RX ORDER — IBUPROFEN 800 MG/1
800 TABLET ORAL 4 TIMES DAILY PRN
Qty: 120 TABLET | Refills: 0 | Status: SHIPPED | OUTPATIENT
Start: 2020-07-20

## 2020-08-18 ENCOUNTER — HOSPITAL ENCOUNTER (EMERGENCY)
Age: 25
Discharge: HOME | End: 2020-08-18
Payer: COMMERCIAL

## 2020-08-18 VITALS
RESPIRATION RATE: 16 BRPM | TEMPERATURE: 98.3 F | WEIGHT: 220 LBS | BODY MASS INDEX: 35.36 KG/M2 | SYSTOLIC BLOOD PRESSURE: 126 MMHG | DIASTOLIC BLOOD PRESSURE: 81 MMHG | OXYGEN SATURATION: 97 % | HEART RATE: 92 BPM | HEIGHT: 66 IN

## 2020-08-18 PROCEDURE — 99213 OFFICE O/P EST LOW 20 MIN: CPT | Performed by: NURSE PRACTITIONER

## 2020-08-18 PROCEDURE — 99213 OFFICE O/P EST LOW 20 MIN: CPT

## 2020-08-18 RX ORDER — AMOXICILLIN AND CLAVULANATE POTASSIUM 875; 125 MG/1; MG/1
1 TABLET, FILM COATED ORAL 2 TIMES DAILY
Qty: 14 TABLET | Refills: 0 | Status: SHIPPED | OUTPATIENT
Start: 2020-08-18 | End: 2020-08-25

## 2020-08-18 RX ORDER — PSEUDOEPHEDRINE HYDROCHLORIDE 30 MG/1
30 TABLET ORAL EVERY 4 HOURS PRN
COMMUNITY
End: 2021-04-29

## 2020-08-18 RX ORDER — PREDNISONE 20 MG/1
40 TABLET ORAL DAILY
Qty: 10 TABLET | Refills: 0 | Status: SHIPPED | OUTPATIENT
Start: 2020-08-18 | End: 2020-08-23

## 2020-08-18 ASSESSMENT — ENCOUNTER SYMPTOMS
SORE THROAT: 0
DIARRHEA: 0
COUGH: 1
NAUSEA: 0
RHINORRHEA: 0
SHORTNESS OF BREATH: 0
SINUS PRESSURE: 1
VOMITING: 0

## 2020-08-18 ASSESSMENT — PAIN DESCRIPTION - FREQUENCY: FREQUENCY: CONTINUOUS

## 2020-08-18 ASSESSMENT — PAIN SCALES - GENERAL: PAINLEVEL_OUTOF10: 5

## 2020-08-18 ASSESSMENT — PAIN DESCRIPTION - DESCRIPTORS: DESCRIPTORS: PRESSURE

## 2020-08-18 ASSESSMENT — PAIN DESCRIPTION - LOCATION: LOCATION: FACE

## 2020-08-18 ASSESSMENT — PAIN - FUNCTIONAL ASSESSMENT: PAIN_FUNCTIONAL_ASSESSMENT: ACTIVITIES ARE NOT PREVENTED

## 2020-08-18 ASSESSMENT — PAIN DESCRIPTION - ONSET: ONSET: GRADUAL

## 2020-08-18 ASSESSMENT — PAIN DESCRIPTION - PROGRESSION: CLINICAL_PROGRESSION: GRADUALLY WORSENING

## 2020-08-18 NOTE — ED NOTES
PT GIVEN DISCHARGE INSTRUCTIONS, VERBALIZES UNDERSTANDING. PT ASSESSMENT UNCHANGED, DISCHARGED IN STABLE CONDITION.         Lakisha Calvo RN  08/18/20 7586

## 2020-08-18 NOTE — ED PROVIDER NOTES
[address]  Urgent Care Encounter       CHIEF COMPLAINT       Chief Complaint   Patient presents with    Sinusitis       Nurses Notes reviewed and I agree except as noted in the HPI. HISTORY OF PRESENT ILLNESS   Daylin Dill is a 26 y.o. female who presents with complaints of possible sinus infection. Patient reports nasal and sinus pressure and congestion for the past 7 days. She also reports headaches and postnasal drip. She has occasional cough. No reports of fever, chills, nausea, vomiting, diarrhea, sore throat or change in smell or taste. She has been taking Claritin and Sudafed with minimal relief. The history is provided by the patient. REVIEW OF SYSTEMS     Review of Systems   Constitutional: Negative for appetite change, chills, fatigue and fever. HENT: Positive for congestion, ear pain (Fullness) and sinus pressure. Negative for rhinorrhea and sore throat. Respiratory: Positive for cough (Occasional). Negative for shortness of breath. Cardiovascular: Negative for chest pain. Gastrointestinal: Negative for diarrhea, nausea and vomiting. Musculoskeletal: Negative for myalgias. Skin: Negative for rash. Neurological: Positive for headaches. Negative for dizziness. PAST MEDICAL HISTORY   History reviewed. No pertinent past medical history. SURGICALHISTORY     Patient  has a past surgical history that includes Ona tooth extraction.     CURRENT MEDICATIONS       Discharge Medication List as of 8/18/2020 12:16 PM      CONTINUE these medications which have NOT CHANGED    Details   pseudoephedrine (SUDAFED) 30 MG tablet Take 30 mg by mouth every 4 hours as needed for CongestionHistorical Med      DM-APAP-CPM (VICKS NYQUIL COLD & FLU NIGHT PO) Take by mouthHistorical Med      ibuprofen (ADVIL;MOTRIN) 800 MG tablet Take 1 tablet by mouth 4 times daily as needed for Pain, Disp-120 tablet,R-0Normal      baclofen (LIORESAL) 10 MG tablet Take 1/2 tenderness present. Left Sinus: Maxillary sinus tenderness and frontal sinus tenderness present. Mouth/Throat:      Lips: Pink. Mouth: Mucous membranes are moist.      Pharynx: Oropharynx is clear. Posterior oropharyngeal erythema (Mild) present. No oropharyngeal exudate. Eyes:      General: Lids are normal. No scleral icterus. Conjunctiva/sclera:      Right eye: Right conjunctiva is not injected. Left eye: Left conjunctiva is not injected. Pupils: Pupils are equal.   Cardiovascular:      Rate and Rhythm: Normal rate and regular rhythm. Heart sounds: Normal heart sounds, S1 normal and S2 normal.   Pulmonary:      Effort: Pulmonary effort is normal. No respiratory distress. Breath sounds: Normal breath sounds. Musculoskeletal:      Comments: Normal active ROM x 4 extremities  Gait steady   Lymphadenopathy:      Comments: No head or neck adenopathy   Skin:     General: Skin is warm and dry. Findings: No rash (to exposed areas of skin). Nails: There is no clubbing. Neurological:      General: No focal deficit present. Mental Status: She is alert and oriented to person, place, and time. Sensory: No sensory deficit. Comments: Answers questions readily and appropriately   Psychiatric:         Mood and Affect: Mood normal.         Speech: Speech normal.         Behavior: Behavior normal. Behavior is cooperative. DIAGNOSTIC RESULTS     Labs:No results found for this visit on 08/18/20. IMAGING:    No orders to display         EKG:      URGENT CARE COURSE:     Vitals:    08/18/20 1121   BP: 126/81   Pulse: 92   Resp: 16   Temp: 98.3 °F (36.8 °C)   TempSrc: Oral   SpO2: 97%   Weight: 220 lb (99.8 kg)   Height: 5' 6\" (1.676 m)       Medications - No data to display         PROCEDURES:  None    FINAL IMPRESSION      1. Acute bacterial sinusitis          DISPOSITION/ PLAN     Patient presents with acute bacterial sinusitis.   She will be treated with Augmentin and prednisone. Continue OTC meds as desired for symptom management. Follow-up with family doctor in 1 week if not improved. If symptoms do not improve or change or worsen, consider COVID-19 testing. Further instructions were outlined verbally and in the patient's discharge instructions. All the patient's questions were answered. The patient/parent agreed with the plan and was discharged from the [facility] in good condition.       PATIENT REFERRED TO:  DEL Medrano CNP  [address] [facility]  / [place] [id number]      DISCHARGE MEDICATIONS:  Discharge Medication List as of 8/18/2020 12:16 PM      START taking these medications    Details   amoxicillin-clavulanate (AUGMENTIN) 875-125 MG per tablet Take 1 tablet by mouth 2 times daily for 7 days, Disp-14 tablet,R-0Normal      predniSONE (DELTASONE) 20 MG tablet Take 2 tablets by mouth daily for 5 days, Disp-10 tablet,R-0Normal             Discharge Medication List as of 8/18/2020 12:16 PM          Discharge Medication List as of 8/18/2020 12:16 PM          DEL Pacheco CNP    (Please note that portions of this note were completed with a voice recognition program. Efforts were made to edit the dictations but occasionally words are mis-transcribed.)         DEL Pacheco CNP  08/18/20 2722

## 2020-10-12 ENCOUNTER — OFFICE VISIT (OUTPATIENT)
Dept: FAMILY MEDICINE CLINIC | Age: 25
End: 2020-10-12
Payer: COMMERCIAL

## 2020-10-12 VITALS
BODY MASS INDEX: 36.91 KG/M2 | HEART RATE: 115 BPM | OXYGEN SATURATION: 97 % | WEIGHT: 235.2 LBS | SYSTOLIC BLOOD PRESSURE: 152 MMHG | DIASTOLIC BLOOD PRESSURE: 104 MMHG | TEMPERATURE: 98 F | HEIGHT: 67 IN | RESPIRATION RATE: 16 BRPM

## 2020-10-12 LAB
CONTROL: NORMAL
PREGNANCY TEST URINE, POC: NEGATIVE

## 2020-10-12 PROCEDURE — 81025 URINE PREGNANCY TEST: CPT | Performed by: NURSE PRACTITIONER

## 2020-10-12 PROCEDURE — G8484 FLU IMMUNIZE NO ADMIN: HCPCS | Performed by: NURSE PRACTITIONER

## 2020-10-12 PROCEDURE — G8427 DOCREV CUR MEDS BY ELIG CLIN: HCPCS | Performed by: NURSE PRACTITIONER

## 2020-10-12 PROCEDURE — G8417 CALC BMI ABV UP PARAM F/U: HCPCS | Performed by: NURSE PRACTITIONER

## 2020-10-12 PROCEDURE — 1036F TOBACCO NON-USER: CPT | Performed by: NURSE PRACTITIONER

## 2020-10-12 PROCEDURE — 99213 OFFICE O/P EST LOW 20 MIN: CPT | Performed by: NURSE PRACTITIONER

## 2020-10-12 ASSESSMENT — PATIENT HEALTH QUESTIONNAIRE - PHQ9
1. LITTLE INTEREST OR PLEASURE IN DOING THINGS: 0
SUM OF ALL RESPONSES TO PHQ QUESTIONS 1-9: 0
SUM OF ALL RESPONSES TO PHQ9 QUESTIONS 1 & 2: 0
2. FEELING DOWN, DEPRESSED OR HOPELESS: 0
SUM OF ALL RESPONSES TO PHQ QUESTIONS 1-9: 0

## 2020-10-12 NOTE — PROGRESS NOTES
Chief Complaint   Patient presents with    Pregnancy Test     hasnt had menstrual period for 9 days, has taken 4 pregnancy tests       History obtained from the patient. SUBJECTIVE:  Rian Gaffney is a 26 y.o. female that presents today for missed menses    Missed Menses  Hasn't had menses for 9 days, she took 4 pregnancy tests at home which 2 were negative and the other 2 were questionable. She is sexually active but they use condoms consistently. No vaginal symptoms at all, no abdominal pain, but was having some cramping. Age/Gender Health Maintenance    Lipid - 35  DM Screen - 35  Colon Cancer Screening - 48  Lung Cancer Screening (Age 54 to [de-identified] with 30 pack year hx, current smoker or quit within past 15 years) - n/a    Tetanus - needs  Influenza Vaccine - 9/18  Pneumonia Vaccine - 65  Zostavax - 50   HPV Vaccine - n/a    Breast Cancer Screening - 50  Cervical Cancer Screening - needs  Osteoporosis Screening - 65  Chlamydia Screen - needs      Falls screening - n/a    Current Outpatient Medications   Medication Sig Dispense Refill    pseudoephedrine (SUDAFED) 30 MG tablet Take 30 mg by mouth every 4 hours as needed for Congestion      DM-APAP-CPM (VICKS NYQUIL COLD & FLU NIGHT PO) Take by mouth As needed      ibuprofen (ADVIL;MOTRIN) 800 MG tablet Take 1 tablet by mouth 4 times daily as needed for Pain 120 tablet 0    baclofen (LIORESAL) 10 MG tablet Take 1/2 tablet to 1 full tablet by mouth TID for back muscle spasms (Patient taking differently: Take 1/2 tablet to 1 full tablet by mouth TID for back muscle spasms.  AS NEEDED) 30 tablet 1    fluticasone (FLONASE) 50 MCG/ACT nasal spray 1 spray by Nasal route daily for 7 days (Patient taking differently: 1 spray by Nasal route daily As needed) 1 Bottle 0    Norgestim-Eth Estrad Triphasic (TRI-SPRINTEC) 0.18/0.215/0.25 MG-35 MCG TABS Take 1 tablet by mouth daily 28 tablet 2    diphenhydrAMINE (BENADRYL) 25 MG capsule Take 25 mg by mouth every 6 hours as needed for Itching      Aspirin-Acetaminophen-Caffeine (EXCEDRIN MIGRAINE PO) Take 2 tablets by mouth AS NEEDED      SUMAtriptan (IMITREX) 100 MG tablet Take 1 tablet by mouth once as needed for Migraine 9 tablet 3     No current facility-administered medications for this visit. No orders of the defined types were placed in this encounter. All medications reviewed and reconciled, including OTC and herbal medications. Updated list given to patient. There is no problem list on file for this patient. History reviewed. No pertinent past medical history. Past Surgical History:   Procedure Laterality Date    WISDOM TOOTH EXTRACTION         Allergies   Allergen Reactions    Pollen Extract        Social History     Socioeconomic History    Marital status: Single     Spouse name: Not on file    Number of children: Not on file    Years of education: Not on file    Highest education level: Not on file   Occupational History    Not on file   Social Needs    Financial resource strain: Not on file    Food insecurity     Worry: Not on file     Inability: Not on file    Transportation needs     Medical: Not on file     Non-medical: Not on file   Tobacco Use    Smoking status: Never Smoker    Smokeless tobacco: Never Used   Substance and Sexual Activity    Alcohol use: Yes     Comment: occas.     Drug use: No    Sexual activity: Never   Lifestyle    Physical activity     Days per week: Not on file     Minutes per session: Not on file    Stress: Not on file   Relationships    Social connections     Talks on phone: Not on file     Gets together: Not on file     Attends Yarsanism service: Not on file     Active member of club or organization: Not on file     Attends meetings of clubs or organizations: Not on file     Relationship status: Not on file    Intimate partner violence     Fear of current or ex partner: Not on file     Emotionally abused: Not on file     Physically abused: atraumatic  Eyes: pupils equal, round, and reactive to light, extraocular eye movements intact, conjunctivae and eye lids without erythema  Neck: supple and non-tender without mass, no thyromegaly or thyroid nodules, no cervical lymphadenopathy  Pulmonary/Chest: clear to auscultation bilaterally- no wheezes, rales or rhonchi, harsh barky cough  Cardiovascular: S1 and S2 auscultated w/ RRR. No murmurs, rubs, clicks, or gallops, distal pulses intact. Abdomen: soft, non-tender, non-distended, bowl sounds physiologic,  no rebound or guarding, no masses or hernias noted. Liver and spleen without enlargement. Extremities: no cyanosis, clubbing or edema of the lower extremities  Musculoskeletal: No joint swelling or gross deformity   Neuro:  Alert, 5/5 strength globally and symmetrically  Psych: Affect appropriate. Mood normal. Thought process is normal without evidence of depression or psychosis. Good insight and appropriate interaction. Cognition and memory appear to be intact. Skin: warm and dry, no rash  Lymph:  No cervical, auricular or supraclavicular lymph nodes palpated      ASSESSMENT & PLAN  Tabby Lutz was seen today for pregnancy test.    Diagnoses and all orders for this visit:    Missed menses  -     POCT urine pregnancy  -     HCG, Serum, Qualitative; Future    Elevated BP without diagnosis of hypertension      - urine preg negative  - will obtain serum preg    DISPOSITION    Return in 2 weeks (on 10/26/2020) for nurse visit BP check. Tabby Lutz released without restrictions. PATIENT COUNSELING    Counseling was provided today regarding the following topics: Healthy eating habits, Regular exercise, substance abuse and healthy sleep habits.     Annmarie received counseling on the following healthy behaviors: medication adherence    Patient given educational materials on: See Attached    I have instructed Tabby Lutz to complete a self tracking handout on none and instructed them to bring it with them to her next appointment. Barriers to learning and self management: none    Discussed use, benefit, and side effects of prescribed medications. Barriers to medication compliance addressed. All patient questions answered. Pt voiced understanding.        Electronically signed by DEL Hernandez CNP on 10/12/2020 at 3:27 PM

## 2020-10-13 ENCOUNTER — TELEPHONE (OUTPATIENT)
Dept: FAMILY MEDICINE CLINIC | Age: 25
End: 2020-10-13

## 2020-10-13 ENCOUNTER — NURSE ONLY (OUTPATIENT)
Dept: LAB | Age: 25
End: 2020-10-13

## 2020-10-13 LAB — PREGNANCY, SERUM: NEGATIVE

## 2020-10-13 NOTE — TELEPHONE ENCOUNTER
----- Message from DEL Garcia CNP sent at 10/13/2020  4:02 PM EDT -----  Let Natalie Arroyo know her serum preg test was negative

## 2020-10-26 ENCOUNTER — TELEPHONE (OUTPATIENT)
Dept: FAMILY MEDICINE CLINIC | Age: 25
End: 2020-10-26

## 2020-10-26 ENCOUNTER — NURSE ONLY (OUTPATIENT)
Dept: FAMILY MEDICINE CLINIC | Age: 25
End: 2020-10-26

## 2020-10-26 VITALS — HEART RATE: 92 BPM | SYSTOLIC BLOOD PRESSURE: 138 MMHG | DIASTOLIC BLOOD PRESSURE: 88 MMHG

## 2020-10-26 NOTE — TELEPHONE ENCOUNTER
BP looks ok. Hold on meds for now.  I would recommend she periodically monitor it at home or the drug store

## 2020-10-26 NOTE — TELEPHONE ENCOUNTER
LMOM with Guanako's response and also for patient to return a call back to let us know that she got the message

## 2020-11-02 ENCOUNTER — E-VISIT (OUTPATIENT)
Dept: FAMILY MEDICINE CLINIC | Age: 25
End: 2020-11-02
Payer: COMMERCIAL

## 2020-11-02 PROCEDURE — 99421 OL DIG E/M SVC 5-10 MIN: CPT | Performed by: NURSE PRACTITIONER

## 2020-11-02 RX ORDER — TIZANIDINE 4 MG/1
4 TABLET ORAL EVERY 8 HOURS PRN
Qty: 15 TABLET | Refills: 0 | Status: SHIPPED | OUTPATIENT
Start: 2020-11-02

## 2020-12-21 ENCOUNTER — OFFICE VISIT (OUTPATIENT)
Dept: FAMILY MEDICINE CLINIC | Age: 25
End: 2020-12-21
Payer: COMMERCIAL

## 2020-12-21 VITALS
WEIGHT: 239 LBS | BODY MASS INDEX: 38.41 KG/M2 | HEART RATE: 90 BPM | TEMPERATURE: 98.3 F | RESPIRATION RATE: 12 BRPM | DIASTOLIC BLOOD PRESSURE: 80 MMHG | SYSTOLIC BLOOD PRESSURE: 122 MMHG | HEIGHT: 66 IN

## 2020-12-21 PROBLEM — F41.1 GENERALIZED ANXIETY DISORDER: Status: ACTIVE | Noted: 2020-12-21

## 2020-12-21 PROCEDURE — G8484 FLU IMMUNIZE NO ADMIN: HCPCS | Performed by: NURSE PRACTITIONER

## 2020-12-21 PROCEDURE — 99213 OFFICE O/P EST LOW 20 MIN: CPT | Performed by: NURSE PRACTITIONER

## 2020-12-21 PROCEDURE — 1036F TOBACCO NON-USER: CPT | Performed by: NURSE PRACTITIONER

## 2020-12-21 PROCEDURE — G8427 DOCREV CUR MEDS BY ELIG CLIN: HCPCS | Performed by: NURSE PRACTITIONER

## 2020-12-21 PROCEDURE — G8417 CALC BMI ABV UP PARAM F/U: HCPCS | Performed by: NURSE PRACTITIONER

## 2020-12-21 RX ORDER — TRAZODONE HYDROCHLORIDE 50 MG/1
50 TABLET ORAL NIGHTLY PRN
Qty: 30 TABLET | Refills: 0 | Status: SHIPPED | OUTPATIENT
Start: 2020-12-21 | End: 2021-11-08 | Stop reason: SDUPTHER

## 2020-12-21 NOTE — PROGRESS NOTES
Chief Complaint   Patient presents with    Anxiety     pt has always had an issue with anxiety, this has increased, pt is unsure why    Insomnia     caused by anxiety       History obtained from the patient. SUBJECTIVE:  Kylie Haro is a 26 y.o. female that presents today for anxiety    Anxiety     HPI:    Inciting events or triggers for anxiety - work. Home and family life  Frequency of anxiety - daily  Panic attacks? Yes - several weeks ago  Symptoms of panic attacks -  difficulty concentrating, feelings of losing control, shortness of breath, sweating and crying  Sleep Disturbances? Yes - hard time falling and staying asleep  Impaired concentration? Yes - scatter brained  Substance abuse? No  Suicidal/Homicidal Ideation? No    Compliant with meds: yes  Med side effects: No    Sees therapist?: No  Family History of Mental Illness? No    Age/Gender Health Maintenance    Lipid - 35  DM Screen - 35  Colon Cancer Screening - 48  Lung Cancer Screening (Age 54 to [de-identified] with 30 pack year hx, current smoker or quit within past 15 years) - n/a    Tetanus - needs  Influenza Vaccine - 9/18  Pneumonia Vaccine - 65  Zostavax - 50   HPV Vaccine - n/a    Breast Cancer Screening - 50  Cervical Cancer Screening - needs  Osteoporosis Screening - 65  Chlamydia Screen - needs      Falls screening - n/a    Current Outpatient Medications   Medication Sig Dispense Refill    sertraline (ZOLOFT) 50 MG tablet Take 1/2 tablet by mouth daily x 7 days, then take 1 full tablet by mouth daily.  30 tablet 5    traZODone (DESYREL) 50 MG tablet Take 1 tablet by mouth nightly as needed for Sleep 30 tablet 0    tiZANidine (ZANAFLEX) 4 MG tablet Take 1 tablet by mouth every 8 hours as needed (muscle cramps) 15 tablet 0    pseudoephedrine (SUDAFED) 30 MG tablet Take 30 mg by mouth every 4 hours as needed for Congestion      ibuprofen (ADVIL;MOTRIN) 800 MG tablet Take 1 tablet by mouth 4 times daily as needed for Pain 120 tablet 0  baclofen (LIORESAL) 10 MG tablet Take 1/2 tablet to 1 full tablet by mouth TID for back muscle spasms (Patient taking differently: Take 1/2 tablet to 1 full tablet by mouth TID for back muscle spasms. AS NEEDED) 30 tablet 1    SUMAtriptan (IMITREX) 100 MG tablet Take 1 tablet by mouth once as needed for Migraine 9 tablet 3    fluticasone (FLONASE) 50 MCG/ACT nasal spray 1 spray by Nasal route daily for 7 days (Patient taking differently: 1 spray by Nasal route daily As needed) 1 Bottle 0    Norgestim-Eth Estrad Triphasic (TRI-SPRINTEC) 0.18/0.215/0.25 MG-35 MCG TABS Take 1 tablet by mouth daily 28 tablet 2    diphenhydrAMINE (BENADRYL) 25 MG capsule Take 25 mg by mouth every 6 hours as needed for Itching      Aspirin-Acetaminophen-Caffeine (EXCEDRIN MIGRAINE PO) Take 2 tablets by mouth AS NEEDED       No current facility-administered medications for this visit. Orders Placed This Encounter   Medications    sertraline (ZOLOFT) 50 MG tablet     Sig: Take 1/2 tablet by mouth daily x 7 days, then take 1 full tablet by mouth daily. Dispense:  30 tablet     Refill:  5    traZODone (DESYREL) 50 MG tablet     Sig: Take 1 tablet by mouth nightly as needed for Sleep     Dispense:  30 tablet     Refill:  0         All medications reviewed and reconciled, including OTC and herbal medications. Updated list given to patient. Patient Active Problem List   Diagnosis    Generalized anxiety disorder       History reviewed. No pertinent past medical history.     Past Surgical History:   Procedure Laterality Date    WISDOM TOOTH EXTRACTION         Allergies   Allergen Reactions    Pollen Extract Itching and Other (See Comments)     Sinus issues       Social History     Socioeconomic History    Marital status: Single     Spouse name: Not on file    Number of children: Not on file    Years of education: Not on file    Highest education level: Not on file   Occupational History    Not on file   Social Needs  Financial resource strain: Not on file   Kaur-Terell insecurity     Worry: Not on file     Inability: Not on file   [employer] needs     Medical: Not on file     Non-medical: Not on file   Tobacco Use    Smoking status: Never Smoker    Smokeless tobacco: Never Used   Substance and Sexual Activity    Alcohol use: Yes     Comment: occas.  Drug use: No    Sexual activity: Never   Lifestyle    Physical activity     Days per week: Not on file     Minutes per session: Not on file    Stress: Not on file   Relationships    Social connections     Talks on phone: Not on file     Gets together: Not on file     Attends Judaism service: Not on file     Active member of club or organization: Not on file     Attends meetings of clubs or organizations: Not on file     Relationship status: Not on file    Intimate partner violence     Fear of current or ex partner: Not on file     Emotionally abused: Not on file     Physically abused: Not on file     Forced sexual activity: Not on file   Other Topics Concern    Not on file   Social History Narrative    Not on file       Family History   Problem Relation Age of Onset    No Known Problems Mother     No Known Problems Father          I have reviewed the patient's past medical history, past surgical history, allergies, medications, social and family history and I have made updates where appropriate.       Review of Systems  Positive responses are highlighted in bold    Constitutional:  Fever, Chills, Night Sweats, Fatigue, Unexpected changes in weight  Eyes:  Eye discharge, Eye pain, Eye redness, Visual disturbances   HENT:  Ear pain, Tinnitus, Nosebleeds, Trouble swallowing, Hearing loss, Sore throat  Cardiovascular:  Chest Pain, Palpitations, Orthopnea, Paroxysmal Nocturnal Dyspnea  Respiratory:  Cough, Wheezing, Shortness of breath, Chest tightness, Apnea  Gastrointestinal:  Nausea, Vomiting, Diarrhea, Constipation, Heartburn, Blood in stool  Genitourinary: Difficulty or painful urination, Flank pain, Change in frequency, Urgency  Skin:  Color change, Rash, Itching, Wound  Psychiatric:  Hallucinations, Anxiety, Depression, Suicidal ideation  Hematological:  Enlarged glands, Easy bleeding, Easily bruising  Musculoskeletal:  Joint pain, Back pain, Gait problems, Joint swelling, Myalgias  Neurological:  Dizziness, Headaches, Presyncope, Numbness, Seizures, Tremors  Allergy:  Environmental allergies, Food allergies  Endocrine:  Heat Intolerance, Cold Intolerance, Polydipsia, Polyphagia, Polyuria      PHYSICAL EXAM:  Vitals:    12/21/20 1019   BP: 122/80   Pulse: 90   Resp: 12   Temp: 98.3 °F (36.8 °C)   TempSrc: Oral   Weight: 239 lb (108.4 kg)   Height: 5' 6\" (1.676 m)     Body mass index is 38.58 kg/m². VS Reviewed  General Appearance: A&O x 3, No acute distress,well developed and well- nourished  Head: normocephalic and atraumatic  Eyes: pupils equal, round, and reactive to light, extraocular eye movements intact, conjunctivae and eye lids without erythema  Neck: supple and non-tender without mass, no thyromegaly or thyroid nodules, no cervical lymphadenopathy  Pulmonary/Chest: clear to auscultation bilaterally- no wheezes, rales or rhonchi, harsh barky cough  Cardiovascular: S1 and S2 auscultated w/ RRR. No murmurs, rubs, clicks, or gallops, distal pulses intact. Abdomen: soft, non-tender, non-distended, bowl sounds physiologic,  no rebound or guarding, no masses or hernias noted. Liver and spleen without enlargement. Extremities: no cyanosis, clubbing or edema of the lower extremities  Musculoskeletal: No joint swelling or gross deformity   Neuro:  Alert, 5/5 strength globally and symmetrically  Psych: Affect appropriate. Mood normal. Thought process is normal without evidence of depression or psychosis. Good insight and appropriate interaction. Cognition and memory appear to be intact.   Skin: warm and dry, no rash  Lymph:  No cervical, auricular or supraclavicular lymph nodes palpated      ASSESSMENT & PLAN  Tanmay Gasca was seen today for anxiety and insomnia. Diagnoses and all orders for this visit:    Generalized anxiety disorder  -     sertraline (ZOLOFT) 50 MG tablet; Take 1/2 tablet by mouth daily x 7 days, then take 1 full tablet by mouth daily.  -     [facility] [place], [address], [address]    Psychophysiologic insomnia  -     traZODone (DESYREL) 50 MG tablet; Take 1 tablet by mouth nightly as needed for Sleep      - start Zoloft for anxiety  - discussed benefits, risks and SE medication  - suicidal precautions  - Trazodone prn for sleep    DISPOSITION    Return in about 6 weeks (around 2/1/2021) for HAYLEE. Tanmay Gasca released without restrictions. PATIENT COUNSELING    Counseling was provided today regarding the following topics: Healthy eating habits, Regular exercise, substance abuse and healthy sleep habits. Annmarie received counseling on the following healthy behaviors: medication adherence    Patient given educational materials on: See Attached    I have instructed Tanmay Gasca to complete a self tracking handout on none and instructed them to bring it with them to her next appointment. Barriers to learning and self management: none    Discussed use, benefit, and side effects of prescribed medications. Barriers to medication compliance addressed. All patient questions answered. Pt voiced understanding.        Electronically signed by DEL Stewart CNP on 12/21/2020 at 11:04 AM

## 2020-12-22 ENCOUNTER — OFFICE VISIT (OUTPATIENT)
Dept: BEHAVIORAL/MENTAL HEALTH CLINIC | Age: 25
End: 2020-12-22
Payer: COMMERCIAL

## 2020-12-22 PROCEDURE — 1036F TOBACCO NON-USER: CPT | Performed by: SOCIAL WORKER

## 2020-12-22 PROCEDURE — 90791 PSYCH DIAGNOSTIC EVALUATION: CPT | Performed by: SOCIAL WORKER

## 2020-12-22 NOTE — PATIENT INSTRUCTIONS
1. The Stress Response and How It Can Affect You   The stress response, or fight or flight response is the emergency reaction system of the body. It is there to keep you safe in emergencies. The stress response includes physical and thought responses to your perception of various situations. When the stress response is turned on, your body may release substances like adrenaline and cortisol. Your organs are programmed to respond in certain ways to situations that are viewed as challenging or threatening. The stress response can work against you. You can turn it on when you dont really need it and, as a result, perceive something as an emergency when its really not. It can turn on when you are just thinking about past or future events. Harmless, chronic conditions can be intensified by the stress response activating too often, with too much intensity, or for too long. Stress responses can be different for different individuals. Below is a list of some common stress related responses people have. ([place] the responses you have had in the last 2 weeks.)                                                                                                 Physical Responses   Muscle aches   Insomnia   ? Heart rate   Headache   Weight gain   Nausea   Constipation   Dry mouth   Muscle twitching  Weight loss   Low energy   Weakness   Tight chest   Diarrhea   Dizziness   Trembling   Stomach cramps  Chills    Hot flashes   Sweating   Pounding heart  Choking feeling  Chest pain   Leg cramps   Numb hands/feet Dry throat   Appetite change  Face flushing   ? Blood pressure  Light-headedness  Feeling faint        Trouble swallowing   Rash ?    Urination   Neck pain             Tingling hands/feet                                                                                             Emotional and Thought Responses   Restlessness   Agitation   Insecurity             Worthlessness Anxiety   Stress    Depression             Hopelessness   Guilt    Defensiveness  Anger            Racing thoughts   Nightmares   Intense thinking  Sensitivity            Expecting the worst   Numbness   Lack of motivation  Mood swings             Forgetfulness   ? Concentration  Rigidity              Preoccupation  Intolerance                                                Behavioral Responses   Avoidance   Withdrawal   Neglect   ? Alcohol use    Smoking   ? Eating   Arguing        Poor appearance   ? Spending   Poor hygiene   ? Eating   Seeking reassurance   Nail biting   Skin picking   ? Talking         ? Body checking   Sexual problems  Foot tapping   Fidgeting Rapid walking    ? Exercise   Teeth clenching           Multitasking   Aggressive speaking       ? Fun activities  ? Sleeping      ? Relaxing activities     Seeking information     The parasympathetic nervous system in your body is designed to turn on your bodys relaxation response. Your behaviors and thinking can keep your bodys natural relaxation response from operating at its best.     Getting your body to relax on a daily basis for at least brief periods can help decrease unpleasant stress responses. Learning to relax your body, through specific breathing and relaxation exercises as well as by minimizing stressful thinking, can help your bodys natural relaxation system be more effective. 2.   Deep Breathing Exercises      Exercise 1:  The Stimulating Breath (also called the Uriel Breath)   Its aim is to raise energy level and increase alertness. ? Inhale and exhale rapidly through your nose, keeping your mouth closed but relaxed. Your breaths in and out should be equal in duration, but as short as possible. This is a noisy breathing exercise. ? Try for three in-and-out breath cycles per second. This produces a quick movement of the diaphragm, suggesting a uriel. Breathe normally after each cycle. ? Do not do for more than 15 seconds on your first try. Each time you practice the Stimulating Breath, you can increase your time by five seconds or so, until you reach a full minute. If done properly, you may feel invigorated, comparable to the heightened awareness you feel after a good workout. You should feel the effort at the back of the neck, the diaphragm, the chest and the abdomen. Try this breathing exercise the next time you need an energy boost and feel yourself reaching for a cup of coffee. Exercise 2:  The 4-7-8 (or Relaxing Breath) Exercise  This exercise is utterly simple, takes almost no time, requires no equipment and can be done anywhere. Although you can do the exercise in any position, sit with your back straight while learning the exercise. Place the tip of your tongue against the ridge of tissue just behind your upper front teeth, and keep it there through the entire exercise. You will be exhaling through your mouth around your tongue; try pursing your lips slightly if this seems awkward. ? Exhale completely through your mouth, making a whoosh sound. ? Close your mouth and inhale quietly through your nose to a mental count of four. ? Hold your breath for a count of seven. ? Exhale completely through your mouth, making a whoosh sound to a count of eight. ? This is one breath. Now inhale again and repeat the cycle three more times for a total of four breaths. Note that you always inhale quietly through your nose and exhale audibly through your mouth. The tip of your tongue stays in position the whole time. Exhalation takes twice as long as inhalation. The absolute time you spend on each phase is not important; the ratio of 4:7:8 is important. If you have trouble holding your breath, speed the exercise up but keep to the ratio of 4:7:8 for the three phases. With practice you can slow it all down and get used to inhaling and exhaling more and more deeply. This exercise is a natural tranquilizer for the nervous system. Unlike tranquilizing drugs, which are often effective when you first take them but then lose their power over time, this exercise is subtle when you first try it but gains in power with repetition and practice. Do it at least twice a day. You cannot do it too frequently. Do NOT do more than 4 breaths at one time for the first month of practice. Later, if you wish, you can extend it to eight breaths. If you feel a little lightheaded when you first breathe this way, do not be concerned; it will pass. Once you develop this technique by practicing it every day, it will be a very useful tool that you will always have with you. Use it whenever anything upsetting happens - before you react. Use it whenever you are aware of internal tension. Use it to help you fall asleep. This exercise cannot be recommended too highly. Everyone can benefit from it. Exercise 3: Meditation exercise  Breath Counting  If you want to get a feel for this challenging work, try your hand at breath counting, a deceptively simple technique. Sit in a comfortable position with the spine straight and head inclined slightly forward. Gently close your eyes and take a few deep breaths. Then let the breath come naturally without trying to influence it. Ideally it will be quiet and slow, but depth and rhythm may vary. ? To begin the exercise, count \"one\" to yourself as you exhale. ? The next time you exhale, count \"two,\" and so on up to [place]. \"  ? Then begin a new cycle, counting \"one\" on the next exhalation. Never count higher than \"five,\" and count only when you exhale. You will know your attention has wandered when you find yourself up to \"eight,\" \"12,\" even \"19. \"  Try to do 10 minutes of this form of meditation. 3. Pt will familiarize self with lovely [facility]. 4. Pt will prioritize effective self care daily/weekly- sleep, relaxation, enjoyable activities. 5. Pt will follow up with DIMITRIOS Sena in two weeks.

## 2020-12-22 NOTE — PROGRESS NOTES
Behavioral Health Consultation  TYLER PardoMartinaS  12/22/2020  8:00 AM EST    This note will not be viewable in [facility] for the following reason(s). This is a Psychotherapy Note. Niko mandel  Time spent with Patient:30 minutes  This is patient's first  [facility] appointment. Reason for Consult:    Chief Complaint   Patient presents with    Anxiety     Referring Provider: Leanna Hernández, APRN - CNP  [address]    Pt provided informed consent for the behavioral health program. Discussed with patient model of service to include the limits of confidentiality (i.e. abuse reporting, suicide intervention, etc.) and short-term intervention focused approach. Pt indicated understanding. Feedback given to PCP. S:  Pt identified the presenting problem as symptoms of high anxiety and indicated this as her primary needs  to address through behavioral health services. The history of the problem was explored with pt in verbalizing high stress related to [place] and increased demands of her job. The current situation was processed with pt in examining  thoughts, feelings, and  Impairment on daily functioning. Pt indicated poor sleep, difficulties with concentration, ruminating on worrisome thoughts, and feeling anxious throughout the day. Pt was guided in exploring areas of behavioral change, development of effective coping strategies, and assessing the management of environmental factors influencing the problem. Pt was advised  to monitor if symptoms worsen or interfere with daily functioning, to consider following-up with either your primary care team or a behavioral health provider for possible counseling or medication management. If suicidal thoughts are experienced, call 911. An additional 24/7 resource is the [facility] at [phone] ([phone]). Pt will attend a follow up appointment in two weeks.     O:  MSE:    Appearance    cooperative  Appetite normal  Sleep disturbance Yes Fatigue No  Loss of pleasure Yes  Impulsive behavior No  Speech    normal rate  Mood    euthymic   Affect    normal affect  Thought Content    intact  Thought Process    coherent  Associations    logical connections  Insight    Fair  Judgment    Intact  Orientation    oriented to person, place, time, and general circumstances  Memory    recent and remote memory intact  Attention/Concentration    intact  Morbid ideation No  Suicide Assessment    no suicidal ideation    History:    TOBACCO:   reports that she has never smoked. She has never used smokeless tobacco.  ETOH:   reports current alcohol use. Family History:   Family History   Problem Relation Age of Onset    No Known Problems Mother     No Known Problems Father        A:       Diagnosis:    1. Generalized anxiety disorder      No past medical history on file. Plan:Pt will attend a follow up appointment in two weeks to assess symptoms and evaluate effectiveness of coping skills. Pt interventions:  Provided handout on  anxiety and stress, Trained in relaxation strategies and Discussed self-care (sleep, nutrition, rewarding activities, social support, exercise)    Pt Behavioral Change Plan:   1. Pt will read handouts about anxiety, stress, and relaxation. 2. Pt will practice self calming skills 2-3x's daily. 3. Pt will promote effective self care daily/weekly- relaxation, enjoyable activities, sleep hygiene, increased physical outlet. 4. Pt will follow up with Chris Putnam, in two weeks.

## 2021-01-03 ENCOUNTER — PATIENT MESSAGE (OUTPATIENT)
Dept: FAMILY MEDICINE CLINIC | Age: 26
End: 2021-01-03

## 2021-01-04 NOTE — TELEPHONE ENCOUNTER
From: Liz Escalante  To: Finn Hardy APRN - CNP  Sent: 1/3/2021 10:52 PM EST  Subject: Non-Urgent Medical Question    I tried to do an E-Visit and it said that I should talk with my doctor. I have had diarrhea for about 2 to 3 days now. I am having some upper abdominal pain. No fever that I know of. I also have not had a lot of urine out put. No vomiting and no dizziness.  Just not sure what to do

## 2021-04-29 ENCOUNTER — HOSPITAL ENCOUNTER (EMERGENCY)
Age: 26
Discharge: HOME | End: 2021-04-29
Payer: COMMERCIAL

## 2021-04-29 VITALS
WEIGHT: 220 LBS | BODY MASS INDEX: 35.36 KG/M2 | SYSTOLIC BLOOD PRESSURE: 156 MMHG | HEIGHT: 66 IN | TEMPERATURE: 97.5 F | DIASTOLIC BLOOD PRESSURE: 90 MMHG | OXYGEN SATURATION: 98 % | HEART RATE: 98 BPM | RESPIRATION RATE: 16 BRPM

## 2021-04-29 DIAGNOSIS — J02.9 ACUTE PHARYNGITIS, UNSPECIFIED ETIOLOGY: Primary | ICD-10-CM

## 2021-04-29 LAB
GROUP A STREP CULTURE, REFLEX: NEGATIVE
REFLEX THROAT C + S: NORMAL

## 2021-04-29 PROCEDURE — 87880 STREP A ASSAY W/OPTIC: CPT

## 2021-04-29 PROCEDURE — 87070 CULTURE OTHR SPECIMN AEROBIC: CPT

## 2021-04-29 PROCEDURE — 99213 OFFICE O/P EST LOW 20 MIN: CPT | Performed by: NURSE PRACTITIONER

## 2021-04-29 PROCEDURE — 99213 OFFICE O/P EST LOW 20 MIN: CPT

## 2021-04-29 ASSESSMENT — ENCOUNTER SYMPTOMS
SINUS PRESSURE: 0
SHORTNESS OF BREATH: 0
SORE THROAT: 1
NAUSEA: 0
COUGH: 1
VOMITING: 0

## 2021-04-29 NOTE — ED PROVIDER NOTES
[address]  Urgent Care Encounter       CHIEF COMPLAINT       Chief Complaint   Patient presents with    Pharyngitis     swollen tonsil       Nurses Notes reviewed and I agree except as noted in the HPI. HISTORY OF PRESENT ILLNESS   Manny Munoz is a 23 y.o. female who presents with complaints of sore throat and a swollen right tonsil. Symptoms have been present for the past 4 to 5 days. Patient also reports an occasional cough as well as nasal congestion. Occasional mild right otalgia. She denies fever, chills, body aches, nausea, ankle area and headaches. 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 No known sick contacts. She has received the COVID-19 vaccination. The history is provided by the patient. REVIEW OF SYSTEMS     Review of Systems   Constitutional: Negative for appetite change, chills and fever. HENT: Positive for congestion, ear pain and sore throat. Negative for sinus pressure. Respiratory: Positive for cough. Negative for shortness of breath. Cardiovascular: Negative for chest pain. Gastrointestinal: Negative for nausea and vomiting. Musculoskeletal: Negative for myalgias. Skin: Negative for rash. Neurological: Negative for headaches. PAST MEDICAL HISTORY   History reviewed. No pertinent past medical history. SURGICALHISTORY     Patient  has a past surgical history that includes [place] tooth extraction. CURRENT MEDICATIONS       Discharge Medication List as of 4/29/2021 10:07 AM      CONTINUE these medications which have NOT CHANGED    Details   sertraline (ZOLOFT) 50 MG tablet Take 1/2 tablet by mouth daily x 7 days, then take 1 full tablet by mouth daily. , Disp-30 tablet, R-5Normal      traZODone (DESYREL) 50 MG tablet Take 1 tablet by mouth nightly as needed for Sleep, Disp-30 tablet, R-0Normal      tiZANidine (ZANAFLEX) 4 MG tablet Take 1 tablet by mouth every 8 hours as needed (muscle cramps), Disp-15 tablet, R-0Normal      ibuprofen (ADVIL;MOTRIN) 800 MG tablet Take 1 tablet by mouth 4 times daily as needed for Pain, Disp-120 tablet,R-0Normal      baclofen (LIORESAL) 10 MG tablet Take 1/2 tablet to 1 full tablet by mouth TID for back muscle spasms, Disp-30 tablet, R-1Normal      SUMAtriptan (IMITREX) 100 MG tablet Take 1 tablet by mouth once as needed for Migraine, Disp-9 tablet, R-3Normal      fluticasone (FLONASE) 50 MCG/ACT nasal spray 1 spray by Nasal route daily for 7 days, Disp-1 Bottle, R-0Normal      Norgestim-Eth Estrad Triphasic (TRI-SPRINTEC) 0.18/0.215/0.25 MG-35 MCG TABS Take 1 tablet by mouth daily, Disp-28 tablet, R-2Normal      diphenhydrAMINE (BENADRYL) 25 MG capsule Take 25 mg by mouth every 6 hours as needed for ItchingHistorical Med      Aspirin-Acetaminophen-Caffeine (EXCEDRIN MIGRAINE PO) Take 2 tablets by mouth AS NEEDEDHistorical Med             ALLERGIES     Patient is is allergic to pollen extract. Patients   There is no immunization history on file for this patient. FAMILY HISTORY     Patient's family history includes No Known Problems in her father and mother. SOCIAL HISTORY     Patient  reports that she has never smoked. She has never used smokeless tobacco. She reports current alcohol use. She reports that she does not use drugs. PHYSICAL EXAM     ED TRIAGE VITALS  BP: (!) 156/90, Temp: 97.5 °F (36.4 °C), Pulse: 98, Resp: 16, SpO2: 98 %,Estimated body mass index is 35.51 kg/m² as calculated from the following:    Height as of this encounter: 5' 6\" (1.676 m). Weight as of this encounter: 220 lb (99.8 kg). ,Patient's last menstrual period was 04/08/2021. Physical Exam  Vitals signs and nursing note reviewed. Constitutional:       General: She is not in acute distress. Appearance: She is well-developed. She is not ill-appearing. HENT:      Head: Normocephalic and atraumatic.       Right Ear: Tympanic membrane and ear canal normal.      Left Ear: Tympanic membrane and ear canal normal.      Ears:      Comments: Large amount of cerumen bilaterally     Nose: Congestion present. Mouth/Throat:      Lips: Pink. Mouth: Mucous membranes are moist.      Pharynx: Oropharynx is clear. Uvula midline. Posterior oropharyngeal erythema present. No pharyngeal swelling, oropharyngeal exudate or uvula swelling. Tonsils: No tonsillar exudate. 2+ on the right. 1+ on the left. Eyes:      General: Lids are normal. No scleral icterus. Conjunctiva/sclera:      Right eye: Right conjunctiva is not injected. Left eye: Left conjunctiva is not injected. Pupils: Pupils are equal.   Cardiovascular:      Rate and Rhythm: Normal rate and regular rhythm. Heart sounds: Normal heart sounds, S1 normal and S2 normal.   Pulmonary:      Effort: Pulmonary effort is normal. No respiratory distress. Breath sounds: Normal breath sounds. Musculoskeletal:      Comments: Normal active ROM x 4 extremities  Gait steady   Lymphadenopathy:      Comments: No head or neck adenopathy   Skin:     General: Skin is warm and dry. Findings: No rash (to exposed areas of skin). Nails: There is no clubbing. Neurological:      General: No focal deficit present. Mental Status: She is alert and oriented to person, place, and time. Sensory: No sensory deficit. Comments: Answers questions readily and appropriately   Psychiatric:         Mood and Affect: Mood normal.         Speech: Speech normal.         Behavior: Behavior normal. Behavior is cooperative.          DIAGNOSTIC RESULTS     Labs:  Results for orders placed or performed during the hospital encounter of 04/29/21   Strep A culture, throat   Result Value Ref Range    REFLEX THROAT C + S INDICATED    STREP A ANTIGEN   Result Value Ref Range    GROUP A STREP CULTURE, REFLEX Negative        IMAGING:    No orders to display         EKG:      URGENT CARE COURSE:     Vitals:    04/29/21 0943   BP: (!) 156/90 Pulse: 98   Resp: 16   Temp: 97.5 °F (36.4 °C)   TempSrc: Temporal   SpO2: 98%   Weight: 220 lb (99.8 kg)   Height: 5' 6\" (1.676 m)       Medications - No data to display         PROCEDURES:  None    FINAL IMPRESSION      1. Acute pharyngitis, unspecified etiology          DISPOSITION/ PLAN     Patient presents with acute pharyngitis. Strep screen was negative. Throat culture will be completed and patient notified if treatment is needed. Continue treating with over-the-counter medications as desired for symptom management. Follow-up with family doctor in the next 3 to 4 days if symptoms have not improved. Further instructions were outlined verbally and in the patient's discharge instructions. All the patient's questions were answered. The patient/parent agreed with the plan and was discharged from the [facility] in good condition.       PATIENT REFERRED TO:  DEL Brown CNP  [address] / [address] [place] [address]      DISCHARGE MEDICATIONS:  Discharge Medication List as of 4/29/2021 10:07 AM          Discharge Medication List as of 4/29/2021 10:07 AM      STOP taking these medications       pseudoephedrine (SUDAFED) 30 MG tablet Comments:   Reason for Stopping:               Discharge Medication List as of 4/29/2021 10:07 AM          DEL Guillaume CNP    (Please note that portions of this note were completed with a voice recognition program. Efforts were made to edit the dictations but occasionally words are mis-transcribed.)         DEL Guillaume CNP  04/29/21 1800

## 2021-04-29 NOTE — ED NOTES
To [facility] with complaints of tonsil swelling. States her throat doesn't hurt but her tonsil hurts. Right tonsil somewhat bigger.       Cassy Tyler, ERYN  04/29/21 7320

## 2021-04-30 ENCOUNTER — CARE COORDINATION (OUTPATIENT)
Dept: OTHER | Facility: CLINIC | Age: 26
End: 2021-04-30

## 2021-04-30 NOTE — CARE COORDINATION
[address] [facility] Transitions Initial Follow Up Call    Call within 2 business days of discharge: Yes    Patient: Aura Espana Patient : 1995   MRN: [id number]  Reason for Admission: Sore Throat  Discharge Date: 21 RARS: No data recorded    Last Discharge [facility]       Complaint Diagnosis Description Type Department Provider    21 Pharyngitis Acute pharyngitis, unspecified etiology UC (DISCHARGE) [facility]           Challenges to be reviewed by the provider   Additional needs identified to be addressed with provider No  none       Method of communication with provider : none      Advance Care Planning:   Does patient have an Advance Directive:  reviewed and current. Care Transition Nurse (CTN) contacted the patient by telephone to perform post hospital discharge assessment. Verified name and  with patient as identifiers. Provided introduction to self, and explanation of the CTN role. CTN reviewed discharge instructions, medical action plan and red flags with patient who verbalized understanding. Patient given an opportunity to ask questions and does not have any further questions or concerns at this time. Were discharge instructions available to patient? Yes. Reviewed appropriate site of care based on symptoms and resources available to patient including: PCP and Benefits related nurse triage line. The patient agrees to contact the PCP office for questions related to their healthcare. Medication reconciliation was performed with patient, who verbalizes understanding of administration of home medications. Advised obtaining a 90-day supply of all daily and as-needed medications. Discussed follow-up appointments. If no appointment was previously scheduled, appointment scheduling offered: Yes. Is follow up appointment scheduled within 7 days of discharge? No- Patient agrees to contact PCP, Gage Ward, if s/s worsen.   Non-[facility] follow up appointment(s): n/a    Patient

## 2021-05-01 LAB — THROAT/NOSE CULTURE: NORMAL

## 2021-05-10 ENCOUNTER — CARE COORDINATION (OUTPATIENT)
Dept: OTHER | Facility: CLINIC | Age: 26
End: 2021-05-10

## 2021-05-31 ENCOUNTER — PATIENT MESSAGE (OUTPATIENT)
Dept: FAMILY MEDICINE CLINIC | Age: 26
End: 2021-05-31

## 2021-06-01 NOTE — TELEPHONE ENCOUNTER
Rt ear   appt made   Future Appointments   Date Time Provider Jarred Berrios   6/2/2021  8:20 AM DEL Rodriguez

## 2021-06-01 NOTE — TELEPHONE ENCOUNTER
From: Loyal Goodell  To: Bogdan Abdi APRN - CNP  Sent: 5/31/2021 10:34 PM EDT  Subject: Non-Urgent Medical Question    I believe that I have an ear infection, i have been having ear pain off and on for a couple of weeks now. The last 4 or 5 days misael just been in a lot of pain. i have been taking ibuprofen but its only helping a little bit. Im not sure what else I can do at home for it. Thank you.

## 2021-06-02 ENCOUNTER — OFFICE VISIT (OUTPATIENT)
Dept: FAMILY MEDICINE CLINIC | Age: 26
End: 2021-06-02
Payer: COMMERCIAL

## 2021-06-02 VITALS
SYSTOLIC BLOOD PRESSURE: 138 MMHG | DIASTOLIC BLOOD PRESSURE: 86 MMHG | OXYGEN SATURATION: 98 % | WEIGHT: 244.2 LBS | HEIGHT: 66 IN | HEART RATE: 92 BPM | BODY MASS INDEX: 39.25 KG/M2 | TEMPERATURE: 98.2 F | RESPIRATION RATE: 18 BRPM

## 2021-06-02 DIAGNOSIS — F41.1 GENERALIZED ANXIETY DISORDER: Primary | ICD-10-CM

## 2021-06-02 DIAGNOSIS — H92.01 OTALGIA OF RIGHT EAR: ICD-10-CM

## 2021-06-02 PROCEDURE — G8427 DOCREV CUR MEDS BY ELIG CLIN: HCPCS | Performed by: NURSE PRACTITIONER

## 2021-06-02 PROCEDURE — 99214 OFFICE O/P EST MOD 30 MIN: CPT | Performed by: NURSE PRACTITIONER

## 2021-06-02 PROCEDURE — G8417 CALC BMI ABV UP PARAM F/U: HCPCS | Performed by: NURSE PRACTITIONER

## 2021-06-02 PROCEDURE — 1036F TOBACCO NON-USER: CPT | Performed by: NURSE PRACTITIONER

## 2021-06-02 RX ORDER — AMOXICILLIN AND CLAVULANATE POTASSIUM 875; 125 MG/1; MG/1
1 TABLET, FILM COATED ORAL 2 TIMES DAILY
Qty: 20 TABLET | Refills: 0 | Status: SHIPPED | OUTPATIENT
Start: 2021-06-02 | End: 2021-06-12

## 2021-06-02 RX ORDER — SERTRALINE HYDROCHLORIDE 100 MG/1
100 TABLET, FILM COATED ORAL DAILY
Qty: 90 TABLET | Refills: 0 | Status: SHIPPED | OUTPATIENT
Start: 2021-06-02 | End: 2021-10-08

## 2021-06-02 SDOH — ECONOMIC STABILITY: FOOD INSECURITY: WITHIN THE PAST 12 MONTHS, THE FOOD YOU BOUGHT JUST DIDN'T LAST AND YOU DIDN'T HAVE MONEY TO GET MORE.: NEVER TRUE

## 2021-06-02 SDOH — ECONOMIC STABILITY: FOOD INSECURITY: WITHIN THE PAST 12 MONTHS, YOU WORRIED THAT YOUR FOOD WOULD RUN OUT BEFORE YOU GOT MONEY TO BUY MORE.: NEVER TRUE

## 2021-06-02 ASSESSMENT — PATIENT HEALTH QUESTIONNAIRE - PHQ9
SUM OF ALL RESPONSES TO PHQ QUESTIONS 1-9: 0
1. LITTLE INTEREST OR PLEASURE IN DOING THINGS: 0
SUM OF ALL RESPONSES TO PHQ9 QUESTIONS 1 & 2: 0
SUM OF ALL RESPONSES TO PHQ QUESTIONS 1-9: 0
2. FEELING DOWN, DEPRESSED OR HOPELESS: 0
SUM OF ALL RESPONSES TO PHQ QUESTIONS 1-9: 0

## 2021-06-02 ASSESSMENT — SOCIAL DETERMINANTS OF HEALTH (SDOH): HOW HARD IS IT FOR YOU TO PAY FOR THE VERY BASICS LIKE FOOD, HOUSING, MEDICAL CARE, AND HEATING?: NOT VERY HARD

## 2021-06-02 NOTE — PROGRESS NOTES
Chief Complaint   Patient presents with    Otalgia     right       History obtained from the patient. SUBJECTIVE:  Rubi Elizondo is a [place] 40 y.o. female that presents today for right ear pain    Ear Complaint    HPI:  Symptoms have been present for 5 month(s). Inciting incident or history of trauma? no  Decreased hearing? No  Ear tenderness? No  Ear drainage? No  Feeling of fullness? No  Radiation of the pain? Yes - radiates to her teeth  Dizziness or pre-syncope? No  Affected by position or head movment? No  Sore throat, rhinorrhea or sinus congestion? No  Hx of Bruxism? No  Treatment tried and response - motrin, tylenol    She thinks she might be having a wisdom tooth coming in because the last time she had left ear pain, she went to the dentist and he told her her wisdom tooth was coming in. She used oragel and this helped. She has tried oragel this time without any help. She has appt with dentist coming up. Anxiety     HPI:  Feels like the anxiety is 50% better. Inciting events or triggers for anxiety - work. Home and family life  Frequency of anxiety - 3-4 days/week  Panic attacks? Yes - one since last visit  Symptoms of panic attacks -  difficulty concentrating, feelings of losing control, shortness of breath, sweating and crying  Sleep Disturbances? No, she is sleeping much better  Impaired concentration? Yes - improved. Substance abuse? No  Suicidal/Homicidal Ideation? No    Compliant with meds: yes Zoloft 50 mg  Med side effects: No    Sees therapist?: No  Family History of Mental Illness?  No    Age/Gender Health Maintenance    Lipid - 35  DM Screen - 35  Colon Cancer Screening - 48  Lung Cancer Screening (Age 54 to [de-identified] with 30 pack year hx, current smoker or quit within past 15 years) - n/a    Tetanus - needs  Influenza Vaccine - 9/18  Pneumonia Vaccine - 65  Zostavax - 50   HPV Vaccine - n/a    Breast Cancer Screening - 50  Cervical Cancer Screening - needs  Osteoporosis Screening - Updated list given to patient. Patient Active Problem List   Diagnosis    Generalized anxiety disorder       History reviewed. No pertinent past medical history. Past Surgical History:   Procedure Laterality Date    WISDOM TOOTH EXTRACTION         Allergies   Allergen Reactions    Pollen Extract Itching and Other (See Comments)     Sinus issues       Social History     Socioeconomic History    Marital status: Single     Spouse name: Not on file    Number of children: Not on file    Years of education: Not on file    Highest education level: Not on file   Occupational History    Not on file   Tobacco Use    Smoking status: Never Smoker    Smokeless tobacco: Never Used   Substance and Sexual Activity    Alcohol use: Yes     Comment: occas.  Drug use: No    Sexual activity: Never   Other Topics Concern    Not on file   Social History Narrative    Not on file     Social Determinants of Health     Financial Resource Strain: Low Risk     Difficulty of Paying Living Expenses: Not very hard   Food Insecurity: No Food Insecurity    Worried About Running Out of Food in the Last Year: Never true    Nick of Food in the Last Year: Never true   Transportation Needs:     Lack of Transportation (Medical):      Lack of Transportation (Non-Medical):    Physical Activity:     Days of Exercise per Week:     Minutes of Exercise per Session:    Stress:     Feeling of Stress :    Social Connections:     Frequency of Communication with Friends and Family:     Frequency of Social Gatherings with Friends and Family:     Attends Mosque Services:     Active Member of Clubs or Organizations:     Attends Club or Organization Meetings:     Marital Status:    Intimate Partner Violence:     Fear of Current or Ex-Partner:     Emotionally Abused:     Physically Abused:     Sexually Abused:        Family History   Problem Relation Age of Onset    No Known Problems Mother     No Known Problems Father I have reviewed the patient's past medical history, past surgical history, allergies, medications, social and family history and I have made updates where appropriate. Review of Systems  Positive responses are highlighted in bold    Constitutional:  Fever, Chills, Night Sweats, Fatigue, Unexpected changes in weight  Eyes:  Eye discharge, Eye pain, Eye redness, Visual disturbances   HENT:  Ear pain, Tinnitus, Nosebleeds, Trouble swallowing, Hearing loss, Sore throat  Cardiovascular:  Chest Pain, Palpitations, Orthopnea, Paroxysmal Nocturnal Dyspnea  Respiratory:  Cough, Wheezing, Shortness of breath, Chest tightness, Apnea  Gastrointestinal:  Nausea, Vomiting, Diarrhea, Constipation, Heartburn, Blood in stool  Genitourinary:  Difficulty or painful urination, Flank pain, Change in frequency, Urgency  Skin:  Color change, Rash, Itching, Wound  Psychiatric:  Hallucinations, Anxiety, Depression, Suicidal ideation  Hematological:  Enlarged glands, Easy bleeding, Easily bruising  Musculoskeletal:  Joint pain, Back pain, Gait problems, Joint swelling, Myalgias  Neurological:  Dizziness, Headaches, Presyncope, Numbness, Seizures, Tremors  Allergy:  Environmental allergies, Food allergies  Endocrine:  Heat Intolerance, Cold Intolerance, Polydipsia, Polyphagia, Polyuria      PHYSICAL EXAM:  Vitals:    06/02/21 0816   BP: 138/86   Pulse: 92   Resp: 18   Temp: 98.2 °F (36.8 °C)   TempSrc: Infrared   SpO2: 98%   Weight: 244 lb 3.2 oz (110.8 kg)   Height: 5' 6\" (1.676 m)     Body mass index is 39.41 kg/m².          VS Reviewed  General Appearance: A&O x 3, No acute distress,well developed and well- nourished  Head: normocephalic and atraumatic  Eyes: pupils equal, round, and reactive to light, extraocular eye movements intact, conjunctivae and eye lids without erythema  ENT: bilateral ear canal and TM normal, no cervical lymphadenopathy, oropharynx normal, dentition is normal  Neck: supple and non-tender without mass, no thyromegaly or thyroid nodules, no cervical lymphadenopathy  Pulmonary/Chest: clear to auscultation bilaterally- no wheezes, rales or rhonchi, harsh barky cough  Cardiovascular: S1 and S2 auscultated w/ RRR. No murmurs, rubs, clicks, or gallops, distal pulses intact. Abdomen: soft, non-tender, non-distended, bowl sounds physiologic,  no rebound or guarding, no masses or hernias noted. Liver and spleen without enlargement. Extremities: no cyanosis, clubbing or edema of the lower extremities  Musculoskeletal: No joint swelling or gross deformity   Neuro:  Alert, 5/5 strength globally and symmetrically  Psych: Affect appropriate. Mood normal. Thought process is normal without evidence of depression or psychosis. Good insight and appropriate interaction. Cognition and memory appear to be intact. Skin: warm and dry, no rash  Lymph:  No cervical, auricular or supraclavicular lymph nodes palpated      ASSESSMENT & PLAN  Edilma Etienne was seen today for otalgia. Diagnoses and all orders for this visit:    Generalized anxiety disorder  -     sertraline (ZOLOFT) 100 MG tablet; Take 1 tablet by mouth daily    Otalgia of right ear  -     amoxicillin-clavulanate (AUGMENTIN) 875-125 MG per tablet; Take 1 tablet by mouth 2 times daily for 10 days      - etiology unclear for right ear pain, could definitely be referred from dentition, possible wisdom tooth  - will trial round of Abx, rec'd f/u with dentist  - increase Zoloft for anxiety    DISPOSITION    Return in about 6 weeks (around 7/14/2021) for HAYLEE. Edilma Etienne released without restrictions. PATIENT COUNSELING    Counseling was provided today regarding the following topics: Healthy eating habits, Regular exercise, substance abuse and healthy sleep habits.     Annmarie received counseling on the following healthy behaviors: medication adherence    Patient given educational materials on: See Attached    I have instructed Edilma Etienne to complete a self tracking handout on none and instructed them to bring it with them to her next appointment. Barriers to learning and self management: none    Discussed use, benefit, and side effects of prescribed medications. Barriers to medication compliance addressed. All patient questions answered. Pt voiced understanding.        Electronically signed by DEL Baca CNP on 6/2/2021 at 8:37 AM

## 2021-06-18 ENCOUNTER — OFFICE VISIT (OUTPATIENT)
Dept: FAMILY MEDICINE CLINIC | Age: 26
End: 2021-06-18
Payer: COMMERCIAL

## 2021-06-18 VITALS
WEIGHT: 242 LBS | TEMPERATURE: 98.7 F | HEART RATE: 74 BPM | SYSTOLIC BLOOD PRESSURE: 106 MMHG | OXYGEN SATURATION: 98 % | RESPIRATION RATE: 10 BRPM | BODY MASS INDEX: 42.88 KG/M2 | DIASTOLIC BLOOD PRESSURE: 70 MMHG | HEIGHT: 63 IN

## 2021-06-18 DIAGNOSIS — T37.95XA ALLERGIC DRUG RASH DUE TO ANTI-INFECTIVE AGENT: Primary | ICD-10-CM

## 2021-06-18 DIAGNOSIS — L27.0 ALLERGIC DRUG RASH DUE TO ANTI-INFECTIVE AGENT: Primary | ICD-10-CM

## 2021-06-18 PROCEDURE — G8417 CALC BMI ABV UP PARAM F/U: HCPCS | Performed by: NURSE PRACTITIONER

## 2021-06-18 PROCEDURE — G8427 DOCREV CUR MEDS BY ELIG CLIN: HCPCS | Performed by: NURSE PRACTITIONER

## 2021-06-18 PROCEDURE — 1036F TOBACCO NON-USER: CPT | Performed by: NURSE PRACTITIONER

## 2021-06-18 PROCEDURE — 99213 OFFICE O/P EST LOW 20 MIN: CPT | Performed by: NURSE PRACTITIONER

## 2021-06-18 PROCEDURE — 96372 THER/PROPH/DIAG INJ SC/IM: CPT | Performed by: NURSE PRACTITIONER

## 2021-06-18 RX ORDER — METHYLPREDNISOLONE ACETATE 40 MG/ML
80 INJECTION, SUSPENSION INTRA-ARTICULAR; INTRALESIONAL; INTRAMUSCULAR; SOFT TISSUE ONCE
Status: COMPLETED | OUTPATIENT
Start: 2021-06-18 | End: 2021-06-18

## 2021-06-18 RX ADMIN — METHYLPREDNISOLONE ACETATE 80 MG: 40 INJECTION, SUSPENSION INTRA-ARTICULAR; INTRALESIONAL; INTRAMUSCULAR; SOFT TISSUE at 08:12

## 2021-06-18 NOTE — PROGRESS NOTES
Aura Espana  is a 23 y.o. y/o female that presents for Rash (started last week  after amoxicilin - fine , itches )      Rash    HPI:    Length of time Sx have been present - 1 week  Rash has gotten better since initially starting  Affected areas - whole body  Inciting events or exposures prior to rash starting? Yes - started after she took PCN  Pruritic? Yes  Erythematous? Yes  Weeping or drainage? No  History of Urticaria? No  Fever? No  Painful? No    Review of Systems - General ROS: negative for - chills, fever or night sweats  Respiratory ROS: negative for - shortness of breath, stridor or wheezing    She got Rx for 40 mg Prednisone daily for 5 days and this did help, but hasn't completely gotten rid of the rash. OBJECTIVE:  /70   Pulse 74   Temp 98.7 °F (37.1 °C) (Oral)   Resp 10   Ht 5' 3\" (1.6 m)   Wt 242 lb (109.8 kg)   SpO2 98%   BMI 42.87 kg/m²   She appears well; non-toxic and in no apparent distress. Mouth - mucous membranes moist, pharynx normal without lesions  Chest - clear to auscultation, no wheezes, rales or rhonchi, symmetric air entry  Heart - normal rate, regular rhythm, normal S1, S2, no murmurs, rubs, clicks or gallops  Extremities - no pedal edema noted, intact peripheral pulses  Skin - fine maculopapular rash over bilat arms, chest      ASSESSMENT & PLAN  Corey Rios was seen today for rash. Diagnoses and all orders for this visit:    Allergic drug rash due to anti-infective agent  -     methylPREDNISolone acetate (DEPO-MEDROL) injection 80 mg      - rash consistent with drug rash  - list PCN as allergy  - con't benadryl as needed  - add 80 mg Benadryl    Return if symptoms worsen or fail to improve. Annmarie received counseling on the following healthy behaviors: medication adherence  Reviewed prior labs and health maintenance. Continue current medications, diet and exercise. Discussed use, benefit, and side effects of prescribed medications.

## 2021-06-18 NOTE — PROGRESS NOTES
Medication(s) given during visit:    Administrations This Visit     methylPREDNISolone acetate (DEPO-MEDROL) injection 80 mg     Admin Date  06/18/2021  08:12 Action  Given Dose  80 mg Route  Intramuscular Site  Ventrogluteal Left  Administered By  Sosa Gibson LPN    Ordering Provider: DEL Mccauley Junior, CNP    NDC: 5620-5778-14    Lot#: [id number]    : [address] [place] [address]    Patient Supplied?: No    Comments: 80mg                Patient instructed to remain in clinic for 20 minutes after injection and was advised to report any adverse reaction to me immediately.

## 2021-07-09 ENCOUNTER — TELEPHONE (OUTPATIENT)
Dept: FAMILY MEDICINE CLINIC | Age: 26
End: 2021-07-09

## 2021-07-09 NOTE — TELEPHONE ENCOUNTER
Pt called in because she woke up at about 2:30am because her tampon was full so she had to change it. She woke up again at 7:30 to her menstrual cycle running down her leg and tampon was completely full once again. At 7:45 she changed it again and by 9:44 the tampon was completely full. Pt is using super plus tampons and stated this is not normal  For her what so ever. Pt would like to know why this is happening since this has never happened before. She is having constant cramping. She is taking 800mg iburpeofen but it is not really helping. She has also been using a heating pad which is only helping a little bit. I told her we do have a walk-in clinic that is available from 8-3:30pm and she can walk in and be seen.  She stated she is unable to because she is at work

## 2021-07-09 NOTE — TELEPHONE ENCOUNTER
It is difficult to say why this is happening because we have not evaluated her. I would recommend continuing with ibuprofen 800mg every 8 hours. If it does not improve over the weekend, recommend appt with Amy Hector on Monday.

## 2021-07-16 ENCOUNTER — APPOINTMENT (OUTPATIENT)
Dept: GENERAL RADIOLOGY | Age: 26
End: 2021-07-16
Payer: COMMERCIAL

## 2021-07-16 ENCOUNTER — HOSPITAL ENCOUNTER (EMERGENCY)
Age: 26
Discharge: HOME | End: 2021-07-16
Payer: COMMERCIAL

## 2021-07-16 VITALS
SYSTOLIC BLOOD PRESSURE: 137 MMHG | HEART RATE: 92 BPM | OXYGEN SATURATION: 98 % | DIASTOLIC BLOOD PRESSURE: 68 MMHG | TEMPERATURE: 97.1 F | RESPIRATION RATE: 16 BRPM

## 2021-07-16 DIAGNOSIS — S63.502A SPRAIN OF LEFT WRIST, INITIAL ENCOUNTER: Primary | ICD-10-CM

## 2021-07-16 PROCEDURE — 99213 OFFICE O/P EST LOW 20 MIN: CPT | Performed by: NURSE PRACTITIONER

## 2021-07-16 PROCEDURE — 99213 OFFICE O/P EST LOW 20 MIN: CPT

## 2021-07-16 PROCEDURE — 73110 X-RAY EXAM OF WRIST: CPT

## 2021-07-16 ASSESSMENT — PAIN SCALES - GENERAL: PAINLEVEL_OUTOF10: 4

## 2021-07-16 ASSESSMENT — PAIN DESCRIPTION - DESCRIPTORS: DESCRIPTORS: TENDER

## 2021-07-16 ASSESSMENT — ENCOUNTER SYMPTOMS
SHORTNESS OF BREATH: 0
VOMITING: 0
NAUSEA: 0

## 2021-07-16 ASSESSMENT — PAIN DESCRIPTION - LOCATION: LOCATION: WRIST

## 2021-07-16 ASSESSMENT — PAIN DESCRIPTION - FREQUENCY: FREQUENCY: CONTINUOUS

## 2021-07-16 ASSESSMENT — PAIN DESCRIPTION - ORIENTATION: ORIENTATION: LEFT

## 2021-07-16 ASSESSMENT — PAIN DESCRIPTION - PAIN TYPE: TYPE: ACUTE PAIN

## 2021-07-16 NOTE — ED NOTES
Patient walked to room 5 for a fall when she was walking her dog, her dog pulled her to the ground on the lease and she tried to catch herself with her left hand and wrist approx 2 weeks ago. No other needs.       Ula Goldberg, RN  07/16/21 7220

## 2021-07-16 NOTE — ED NOTES
Patient discharge instructions given to pt and pt verbalized understanding, wrist splint applied, no other needs at this time, and pt left in stable condition.      Roland Calixto RN  07/16/21 9526

## 2021-07-16 NOTE — ED PROVIDER NOTES
[address]  Urgent Care Encounter       CHIEF COMPLAINT       Chief Complaint   Patient presents with    Wrist Injury     left wrist pain from a fall       Nurses Notes reviewed and I agree except as noted in the HPI. HISTORY OF PRESENT ILLNESS   Keyanna Paige is a 23 y.o. female who presents for evaluation of left wrist pain. Patient states that this pain has been ongoing for the past 2 weeks. She states that she was walking her dog 2 weeks ago when she tripped over the dog's leash, falling forward. States that she tried to catch herself with her hands extended. She states that she has been taking ibuprofen at home but has continued to have pain over the past 2 weeks. She denies any numbness or tingling to the hands or any other concerns at this time. The history is provided by the patient. REVIEW OF SYSTEMS     Review of Systems   Constitutional: Negative for chills and fever. Respiratory: Negative for shortness of breath. Cardiovascular: Negative for chest pain. Gastrointestinal: Negative for nausea and vomiting. Musculoskeletal: Positive for arthralgias. Negative for gait problem and myalgias. Skin: Negative for rash. Neurological: Negative for weakness and numbness. Hematological: Does not bruise/bleed easily. PAST MEDICAL HISTORY   History reviewed. No pertinent past medical history. SURGICALHISTORY     Patient  has a past surgical history that includes [place] tooth extraction.     CURRENT MEDICATIONS       Previous Medications    ASPIRIN-ACETAMINOPHEN-CAFFEINE (EXCEDRIN MIGRAINE PO)    Take 2 tablets by mouth AS NEEDED    BACLOFEN (LIORESAL) 10 MG TABLET    Take 1/2 tablet to 1 full tablet by mouth TID for back muscle spasms    DIPHENHYDRAMINE (BENADRYL) 25 MG CAPSULE    Take 25 mg by mouth every 6 hours as needed for Itching    FLUTICASONE (FLONASE) 50 MCG/ACT NASAL SPRAY    1 spray by Nasal route daily for 7 days    IBUPROFEN (ADVIL;MOTRIN) 800 MG TABLET    Take 1 tablet by mouth 4 times daily as needed for Pain    NORGESTIM-ETH ESTRAD TRIPHASIC (TRI-SPRINTEC) 0.18/0.215/0.25 MG-35 MCG TABS    Take 1 tablet by mouth daily    SERTRALINE (ZOLOFT) 100 MG TABLET    Take 1 tablet by mouth daily    SUMATRIPTAN (IMITREX) 100 MG TABLET    Take 1 tablet by mouth once as needed for Migraine    TIZANIDINE (ZANAFLEX) 4 MG TABLET    Take 1 tablet by mouth every 8 hours as needed (muscle cramps)    TRAZODONE (DESYREL) 50 MG TABLET    Take 1 tablet by mouth nightly as needed for Sleep       ALLERGIES     Patient is is allergic to pollen extract and pcn [penicillins]. Patients   There is no immunization history on file for this patient. FAMILY HISTORY     Patient's family history includes No Known Problems in her father and mother. SOCIAL HISTORY     Patient  reports that she has never smoked. She has never used smokeless tobacco. She reports current alcohol use. She reports that she does not use drugs. PHYSICAL EXAM     ED TRIAGE VITALS  BP: 137/68, Temp: 97.1 °F (36.2 °C), Pulse: 92, Resp: 16, SpO2: 98 %,Estimated body mass index is 42.87 kg/m² as calculated from the following:    Height as of 6/18/21: 5' 3\" (1.6 m). Weight as of 6/18/21: 242 lb (109.8 kg). ,Patient's last menstrual period was 07/12/2021. Physical Exam  Vitals and nursing note reviewed. Constitutional:       General: She is not in acute distress. Appearance: She is well-developed. She is not diaphoretic. Eyes:      Conjunctiva/sclera:      Right eye: Right conjunctiva is not injected. Left eye: Left conjunctiva is not injected. Pupils: Pupils are equal.   Cardiovascular:      Rate and Rhythm: Normal rate and regular rhythm. Heart sounds: No murmur heard. Pulmonary:      Effort: Pulmonary effort is normal. No respiratory distress. Breath sounds: Normal breath sounds. Musculoskeletal:      Left wrist: Bony tenderness present.  No swelling or snuff box tenderness. Normal range of motion. Normal pulse. Left hand: No swelling. Normal range of motion. Normal strength. Normal sensation. Normal capillary refill. Normal pulse. Hands:       Cervical back: Normal range of motion. Right knee: Normal range of motion. Left knee: Normal range of motion. Skin:     General: Skin is warm. Findings: No rash. Neurological:      Mental Status: She is alert and oriented to person, place, and time. Psychiatric:         Behavior: Behavior normal.         DIAGNOSTIC RESULTS     Labs:No results found for this visit on 07/16/21. IMAGING:    XR WRIST LEFT (MIN 3 VIEWS)   Final Result       1. No fracture. 2. Mild degenerative change. 3. Small cyst in the distal navicular bone. **This report has been created using voice recognition software. It may contain minor errors which are inherent in voice recognition technology. **      Final report electronically signed by DR Brii Crockett on 7/16/2021 8:41 AM            EKG:  none    URGENT CARE COURSE:     Vitals:    07/16/21 0819 07/16/21 0820   BP:  137/68   Pulse: 92    Resp: 16    Temp: 97.1 °F (36.2 °C)    TempSrc: Temporal    SpO2: 98%        Medications - No data to display         PROCEDURES:  None    FINAL IMPRESSION      1. Sprain of left wrist, initial encounter          DISPOSITION/ PLAN     X-rays are negative for any acute fracture at this time. I discussed degenerative changes of the wrist with the patient and she is given a Velcro wrist splint for what is likely a wrist sprain. Discussed the plan to continue ibuprofen at home as well as ice and elevation. She is instructed to follow-up with her PCP on outpatient basis if her symptoms not improve and she is agreeable to plan as discussed.       PATIENT REFERRED TO:  Flaquita Eddy, APRN - CNP  [id number] / [place] [id number]      DISCHARGE MEDICATIONS:  New Prescriptions    No medications on file       Discontinued Medications

## 2021-07-21 ENCOUNTER — PATIENT MESSAGE (OUTPATIENT)
Dept: FAMILY MEDICINE CLINIC | Age: 26
End: 2021-07-21

## 2021-07-21 DIAGNOSIS — G43.009 MIGRAINE WITHOUT AURA AND WITHOUT STATUS MIGRAINOSUS, NOT INTRACTABLE: ICD-10-CM

## 2021-07-21 RX ORDER — SUMATRIPTAN 100 MG/1
100 TABLET, FILM COATED ORAL
Qty: 9 TABLET | Refills: 3 | Status: SHIPPED | OUTPATIENT
Start: 2021-07-21 | End: 2022-01-11

## 2021-07-21 NOTE — TELEPHONE ENCOUNTER
From: Le Lopez  To: DEL Vail - CNP  Sent: 7/21/2021 12:07 PM EDT  Subject: Prescription Question    Pippa Ngo had a migraine for two days now and I am out of my migraine medicine that Im prescribed. I went to refill it through my chart and it tells me that I cant refill it and usually I can send a request and the prescription is usually sent to [address] [place] [address], so I was just wondering do I have to come in for an appointment to get that refilled?

## 2021-09-21 ENCOUNTER — E-VISIT (OUTPATIENT)
Dept: FAMILY MEDICINE CLINIC | Age: 26
End: 2021-09-21
Payer: COMMERCIAL

## 2021-09-21 DIAGNOSIS — J01.90 ACUTE RHINOSINUSITIS: Primary | ICD-10-CM

## 2021-09-21 PROCEDURE — 99421 OL DIG E/M SVC 5-10 MIN: CPT | Performed by: NURSE PRACTITIONER

## 2021-09-21 RX ORDER — DOXYCYCLINE HYCLATE 100 MG
100 TABLET ORAL 2 TIMES DAILY
Qty: 20 TABLET | Refills: 0 | Status: SHIPPED | OUTPATIENT
Start: 2021-09-21 | End: 2021-10-01

## 2021-09-21 ASSESSMENT — LIFESTYLE VARIABLES: SMOKING_STATUS: NO, I'VE NEVER SMOKED

## 2021-09-21 NOTE — PROGRESS NOTES
On the date of the visit, I have spent 5 minutes in face to face time with patient as well as coordinating care, reviewing previous notes and test results.

## 2021-09-22 ENCOUNTER — OFFICE VISIT (OUTPATIENT)
Dept: FAMILY MEDICINE CLINIC | Age: 26
End: 2021-09-22
Payer: COMMERCIAL

## 2021-09-22 ENCOUNTER — HOSPITAL ENCOUNTER (OUTPATIENT)
Dept: INTERVENTIONAL RADIOLOGY/VASCULAR | Age: 26
Discharge: HOME | End: 2021-09-22
Payer: COMMERCIAL

## 2021-09-22 VITALS
HEART RATE: 92 BPM | OXYGEN SATURATION: 98 % | TEMPERATURE: 98.3 F | BODY MASS INDEX: 39.21 KG/M2 | DIASTOLIC BLOOD PRESSURE: 70 MMHG | SYSTOLIC BLOOD PRESSURE: 124 MMHG | HEIGHT: 66 IN | RESPIRATION RATE: 12 BRPM | WEIGHT: 244 LBS

## 2021-09-22 DIAGNOSIS — R09.89 HOMANS SIGN PRESENT: ICD-10-CM

## 2021-09-22 DIAGNOSIS — R25.2 MUSCLE CRAMPS: ICD-10-CM

## 2021-09-22 DIAGNOSIS — M79.661 RIGHT CALF PAIN: Primary | ICD-10-CM

## 2021-09-22 DIAGNOSIS — M79.661 RIGHT CALF PAIN: ICD-10-CM

## 2021-09-22 PROCEDURE — 93971 EXTREMITY STUDY: CPT

## 2021-09-22 PROCEDURE — 1036F TOBACCO NON-USER: CPT | Performed by: NURSE PRACTITIONER

## 2021-09-22 PROCEDURE — 99214 OFFICE O/P EST MOD 30 MIN: CPT | Performed by: NURSE PRACTITIONER

## 2021-09-22 PROCEDURE — G8427 DOCREV CUR MEDS BY ELIG CLIN: HCPCS | Performed by: NURSE PRACTITIONER

## 2021-09-22 PROCEDURE — G8417 CALC BMI ABV UP PARAM F/U: HCPCS | Performed by: NURSE PRACTITIONER

## 2021-09-22 ASSESSMENT — ENCOUNTER SYMPTOMS: RESPIRATORY NEGATIVE: 1

## 2021-09-22 NOTE — PROGRESS NOTES
[facility] [place] [address] MEDICINE   [address] ASAEL Leary [address]  Dept: [phone]  Dept Fax: [phone]  Loc: [phone]    Ez Reilly is a 23 y.o. femalewho presents today for her medical conditions/complaints as noted below. Clyda Charlotte c/o of Spasms (cramping back of rt calf started  4 days ago , no injury )      HPI:      Pt presents with a 4 day history of right calf pain. States it hurts all day long. She has had a history of cramping of her right calf muscle and has been taking flexeril for it but states this pain is different. It is constant. Also feels as though her right calf is swollen. She denies any recent travel no immobilization surgeries, never had a blood clot in past, does not smoke is on oral contraceptives but does not take them routinely. Denies any fever or redness of her calf. Josiah N/T of her right foot or toes. Current Outpatient Medications   Medication Sig Dispense Refill    doxycycline hyclate (VIBRA-TABS) 100 MG tablet Take 1 tablet by mouth 2 times daily for 10 days 20 tablet 0    sertraline (ZOLOFT) 100 MG tablet Take 1 tablet by mouth daily 90 tablet 0    traZODone (DESYREL) 50 MG tablet Take 1 tablet by mouth nightly as needed for Sleep 30 tablet 0    tiZANidine (ZANAFLEX) 4 MG tablet Take 1 tablet by mouth every 8 hours as needed (muscle cramps) 15 tablet 0    ibuprofen (ADVIL;MOTRIN) 800 MG tablet Take 1 tablet by mouth 4 times daily as needed for Pain 120 tablet 0    baclofen (LIORESAL) 10 MG tablet Take 1/2 tablet to 1 full tablet by mouth TID for back muscle spasms (Patient taking differently: Take 1/2 tablet to 1 full tablet by mouth TID for back muscle spasms.  AS NEEDED) 30 tablet 1    Norgestim-Eth Estrad Triphasic (TRI-SPRINTEC) 0.18/0.215/0.25 MG-35 MCG TABS Take 1 tablet by mouth daily 28 tablet 2    diphenhydrAMINE (BENADRYL) 25 MG capsule Take 25 mg by mouth every 6 hours as needed for Itching      Aspirin-Acetaminophen-Caffeine (EXCEDRIN MIGRAINE PO) Take 2 tablets by mouth AS NEEDED      SUMAtriptan (IMITREX) 100 MG tablet Take 1 tablet by mouth once as needed for Migraine 9 tablet 3    fluticasone (FLONASE) 50 MCG/ACT nasal spray 1 spray by Nasal route daily for 7 days (Patient taking differently: 1 spray by Nasal route daily As needed) 1 Bottle 0     No current facility-administered medications for this visit. History reviewed. No pertinent past medical history. Past Surgical History:   Procedure Laterality Date    WISDOM TOOTH EXTRACTION       Family History   Problem Relation Age of Onset    No Known Problems Mother     No Known Problems Father      Social History     Tobacco Use    Smoking status: Never Smoker    Smokeless tobacco: Never Used   Substance Use Topics    Alcohol use: Yes     Comment: occas. Allergies   Allergen Reactions    Pollen Extract Itching and Other (See Comments)     Sinus issues    Pcn [Penicillins] Rash       Health Maintenance   Topic Date Due    Hepatitis C screen  Never done    Varicella vaccine (1 of 2 - 2-dose childhood series) Never done    HPV vaccine (1 - 2-dose series) Never done    HIV screen  Never done    Pap smear  07/18/2021    Flu vaccine (1) 09/01/2021    DTaP/Tdap/Td vaccine (7 - Td or Tdap) 10/05/2028    Hepatitis B vaccine  Completed    Hib vaccine  Completed    COVID-19 Vaccine  Completed    Hepatitis A vaccine  Aged Out    Meningococcal (ACWY) vaccine  Aged Out    Pneumococcal 0-64 years Vaccine  Aged Out       Subjective:      Review of Systems   Constitutional: Negative for chills, fatigue and fever. Respiratory: Negative. Cardiovascular: Negative. Musculoskeletal: Positive for gait problem and myalgias. Skin: Negative. Neurological: Negative for weakness and numbness. Psychiatric/Behavioral: Negative for self-injury, sleep disturbance and suicidal ideas.        Objective:      /70 Pulse 92   Temp 98.3 °F (36.8 °C) (Oral)   Resp 12   Ht 5' 5.5\" (1.664 m)   Wt 244 lb (110.7 kg)   LMP 09/01/2021   SpO2 98%   BMI 39.99 kg/m²      Physical Exam  Vitals and nursing note reviewed. Constitutional:       Appearance: She is not ill-appearing. Cardiovascular:      Rate and Rhythm: Normal rate and regular rhythm. Pulses: Normal pulses. Heart sounds: Normal heart sounds. No murmur heard. Pulmonary:      Effort: Pulmonary effort is normal. No respiratory distress. Breath sounds: Normal breath sounds. No wheezing. Musculoskeletal:      Comments: RLE pedal pulses strong, right foot cap refill 2+ ROM right ankle normal.     Positive eric's on right , no right calf edema or erythema. Skin:     General: Skin is warm and dry. Capillary Refill: Capillary refill takes less than 2 seconds. Neurological:      Mental Status: She is alert. Assessment/Plan:           1. Right calf pain  Although unlikely R/o RLE DVT vs muscle cramps or overuse injury   Pt in agreement with plan   If negative advise NSAIDS and ace wrap  - VL DUP LOWER EXTREMITY VENOUS RIGHT    2. Homans sign present  As above   - VL DUP LOWER EXTREMITY VENOUS RIGHT    3. Muscle cramps    continue with flexeril    Return if symptoms worsen or fail to improve. Reccommended tobaccocessation options including pharmacologic methods, counseled great than 3 minutesduring this visit:  Yes[]  No  []       Patient given educational materials -see patient instructions. Discussed use, benefit, and side effects of prescribedmedications. All patient questions answered. Pt voiced understanding. Reviewedhealth maintenance. Instructed to continue current medications, diet and exercise. Patient agreed with treatment plan. Follow up as directed.        Electronicallysigned by DEL Silverio CNP on 9/22/2021 at 5:12 PM

## 2021-10-07 ENCOUNTER — PATIENT MESSAGE (OUTPATIENT)
Dept: FAMILY MEDICINE CLINIC | Age: 26
End: 2021-10-07

## 2021-10-07 NOTE — TELEPHONE ENCOUNTER
From: Enoch Alfaro  To: DEL Brumfield CNP  Sent: 10/7/2021 9:34 AM EDT  Subject: Non-Urgent Medical Question    Good Morning, I tried to do an E-visit but it told me I needed to make an appointment, I have been so stressed out the last couple of weeks, I am not sure why I am so stressed out. I have not added anything more to what I am usually doing. I have noticed that I am more anxious as well. I'm just not sure why I am so stressed or so anxious.  Thank you

## 2021-10-08 ENCOUNTER — OFFICE VISIT (OUTPATIENT)
Dept: FAMILY MEDICINE CLINIC | Age: 26
End: 2021-10-08
Payer: COMMERCIAL

## 2021-10-08 VITALS
TEMPERATURE: 98.2 F | WEIGHT: 243.6 LBS | SYSTOLIC BLOOD PRESSURE: 118 MMHG | OXYGEN SATURATION: 96 % | HEART RATE: 82 BPM | HEIGHT: 66 IN | BODY MASS INDEX: 39.15 KG/M2 | DIASTOLIC BLOOD PRESSURE: 84 MMHG | RESPIRATION RATE: 12 BRPM

## 2021-10-08 DIAGNOSIS — F41.1 GENERALIZED ANXIETY DISORDER: Primary | ICD-10-CM

## 2021-10-08 PROCEDURE — G8484 FLU IMMUNIZE NO ADMIN: HCPCS | Performed by: NURSE PRACTITIONER

## 2021-10-08 PROCEDURE — G8417 CALC BMI ABV UP PARAM F/U: HCPCS | Performed by: NURSE PRACTITIONER

## 2021-10-08 PROCEDURE — G8427 DOCREV CUR MEDS BY ELIG CLIN: HCPCS | Performed by: NURSE PRACTITIONER

## 2021-10-08 PROCEDURE — 1036F TOBACCO NON-USER: CPT | Performed by: NURSE PRACTITIONER

## 2021-10-08 PROCEDURE — 99213 OFFICE O/P EST LOW 20 MIN: CPT | Performed by: NURSE PRACTITIONER

## 2021-10-08 RX ORDER — SERTRALINE HYDROCHLORIDE 100 MG/1
150 TABLET, FILM COATED ORAL DAILY
Qty: 135 TABLET | Refills: 0 | Status: SHIPPED | OUTPATIENT
Start: 2021-10-08 | End: 2022-04-29 | Stop reason: SDUPTHER

## 2021-10-08 NOTE — PROGRESS NOTES
Chief Complaint   Patient presents with    Other     alwawys stressed, started gettting really bad 2 weeks ago   [facility] Maintenance     no recent pap       History obtained from the patient. SUBJECTIVE:  Enoch Alfaro is a 23 y.o. female that presents today for follow up anxiety    Anxiety     HPI:  Inciting events or triggers for anxiety - work. Home and family life  Frequency of anxiety - daily constant anxiety  Panic attacks? Yes - a couple yesterday  Symptoms of panic attacks -  difficulty concentrating, feelings of losing control, shortness of breath, sweating and crying  Sleep Disturbances? Can't fall asleep and stay asleep  Impaired concentration? scatterbrained  Substance abuse? No  Suicidal/Homicidal Ideation? No    Compliant with meds: yes Zoloft 100 mg  Med side effects: No    Sees therapist?: No  Family History of Mental Illness?  No    Age/Gender Health Maintenance    Lipid - 40  DM Screen - 40  Colon Cancer Screening - 39  Lung Cancer Screening (Age 54 to [de-identified] with 30 pack year hx, current smoker or quit within past 15 years) - n/a    Tetanus - needs  Influenza Vaccine - 9/18  Pneumonia Vaccine - 65  Zostavax - 50   HPV Vaccine - n/a    Breast Cancer Screening - 50  Cervical Cancer Screening - needs  Osteoporosis Screening - 65  Chlamydia Screen - needs      Falls screening - n/a    Current Outpatient Medications   Medication Sig Dispense Refill    sertraline (ZOLOFT) 100 MG tablet Take 1.5 tablets by mouth daily 135 tablet 0    SUMAtriptan (IMITREX) 100 MG tablet Take 1 tablet by mouth once as needed for Migraine 9 tablet 3    traZODone (DESYREL) 50 MG tablet Take 1 tablet by mouth nightly as needed for Sleep 30 tablet 0    tiZANidine (ZANAFLEX) 4 MG tablet Take 1 tablet by mouth every 8 hours as needed (muscle cramps) 15 tablet 0    ibuprofen (ADVIL;MOTRIN) 800 MG tablet Take 1 tablet by mouth 4 times daily as needed for Pain 120 tablet 0    baclofen (LIORESAL) 10 MG tablet Take 1/2 tablet to 1 full tablet by mouth TID for back muscle spasms (Patient taking differently: Take 1/2 tablet to 1 full tablet by mouth TID for back muscle spasms. AS NEEDED) 30 tablet 1    fluticasone (FLONASE) 50 MCG/ACT nasal spray 1 spray by Nasal route daily for 7 days (Patient taking differently: 1 spray by Nasal route daily As needed) 1 Bottle 0    Norgestim-Eth Estrad Triphasic (TRI-SPRINTEC) 0.18/0.215/0.25 MG-35 MCG TABS Take 1 tablet by mouth daily 28 tablet 2    diphenhydrAMINE (BENADRYL) 25 MG capsule Take 25 mg by mouth every 6 hours as needed for Itching      Aspirin-Acetaminophen-Caffeine (EXCEDRIN MIGRAINE PO) Take 2 tablets by mouth AS NEEDED       No current facility-administered medications for this visit. Orders Placed This Encounter   Medications    sertraline (ZOLOFT) 100 MG tablet     Sig: Take 1.5 tablets by mouth daily     Dispense:  135 tablet     Refill:  0         All medications reviewed and reconciled, including OTC and herbal medications. Updated list given to patient. Patient Active Problem List   Diagnosis    Generalized anxiety disorder       History reviewed. No pertinent past medical history. Past Surgical History:   Procedure Laterality Date    WISDOM TOOTH EXTRACTION         Allergies   Allergen Reactions    Pollen Extract Itching and Other (See Comments)     Sinus issues    Pcn [Penicillins] Rash       Social History     Socioeconomic History    Marital status: Single     Spouse name: Not on file    Number of children: Not on file    Years of education: Not on file    Highest education level: Not on file   Occupational History    Not on file   Tobacco Use    Smoking status: Never Smoker    Smokeless tobacco: Never Used   Substance and Sexual Activity    Alcohol use: Yes     Comment: occas.     Drug use: No    Sexual activity: Never   Other Topics Concern    Not on file   Social History Narrative    Not on file     Social Determinants of Health Financial Resource Strain: Low Risk     Difficulty of Paying Living Expenses: Not very hard   Food Insecurity: No Food Insecurity    Worried About Running Out of Food in the Last Year: Never true    Nick of Food in the Last Year: Never true   Transportation Needs:     Lack of Transportation (Medical):  Lack of Transportation (Non-Medical):    Physical Activity:     Days of Exercise per Week:     Minutes of Exercise per Session:    Stress:     Feeling of Stress :    Social Connections:     Frequency of Communication with Friends and Family:     Frequency of Social Gatherings with Friends and Family:     Attends [employer] Services:     Active Member of Clubs or Organizations:     Attends Club or Organization Meetings:     Marital Status:    Intimate Partner Violence:     Fear of Current or Ex-Partner:     Emotionally Abused:     Physically Abused:     Sexually Abused:        Family History   Problem Relation Age of Onset    No Known Problems Mother     No Known Problems Father          I have reviewed the patient's past medical history, past surgical history, allergies, medications, social and family history and I have made updates where appropriate.       Review of Systems  Positive responses are highlighted in bold    Constitutional:  Fever, Chills, Night Sweats, Fatigue, Unexpected changes in weight  Eyes:  Eye discharge, Eye pain, Eye redness, Visual disturbances   HENT:  Ear pain, Tinnitus, Nosebleeds, Trouble swallowing, Hearing loss, Sore throat  Cardiovascular:  Chest Pain, Palpitations, Orthopnea, Paroxysmal Nocturnal Dyspnea  Respiratory:  Cough, Wheezing, Shortness of breath, Chest tightness, Apnea  Gastrointestinal:  Nausea, Vomiting, Diarrhea, Constipation, Heartburn, Blood in stool  Genitourinary:  Difficulty or painful urination, Flank pain, Change in frequency, Urgency  Skin:  Color change, Rash, Itching, Wound  Psychiatric:  Hallucinations, Anxiety, Depression, Suicidal ideation  Hematological:  Enlarged glands, Easy bleeding, Easily bruising  Musculoskeletal:  Joint pain, Back pain, Gait problems, Joint swelling, Myalgias  Neurological:  Dizziness, Headaches, Presyncope, Numbness, Seizures, Tremors  Allergy:  Environmental allergies, Food allergies  Endocrine:  Heat Intolerance, Cold Intolerance, Polydipsia, Polyphagia, Polyuria      PHYSICAL EXAM:  Vitals:    10/08/21 0741   BP: 118/84   Pulse: 82   Resp: 12   Temp: 98.2 °F (36.8 °C)   TempSrc: Oral   SpO2: 96%   Weight: 243 lb 9.6 oz (110.5 kg)   Height: 5' 6\" (1.676 m)     Body mass index is 39.32 kg/m². VS Reviewed  General Appearance: A&O x 3, No acute distress,well developed and well- nourished  Head: normocephalic and atraumatic  Eyes: pupils equal, round, and reactive to light, extraocular eye movements intact, conjunctivae and eye lids without erythema  Neck: supple and non-tender without mass, no thyromegaly or thyroid nodules, no cervical lymphadenopathy  Pulmonary/Chest: clear to auscultation bilaterally- no wheezes, rales or rhonchi, harsh barky cough  Cardiovascular: S1 and S2 auscultated w/ RRR. No murmurs, rubs, clicks, or gallops, distal pulses intact. Abdomen: soft, non-tender, non-distended, bowl sounds physiologic,  no rebound or guarding, no masses or hernias noted. Liver and spleen without enlargement. Extremities: no cyanosis, clubbing or edema of the lower extremities  Musculoskeletal: No joint swelling or gross deformity   Neuro:  Alert, 5/5 strength globally and symmetrically  Psych: Affect appropriate. Mood normal. Thought process is normal without evidence of depression or psychosis. Good insight and appropriate interaction. Cognition and memory appear to be intact. Skin: warm and dry, no rash  Lymph:  No cervical, auricular or supraclavicular lymph nodes palpated      ASSESSMENT & PLAN  Marie Weir was seen today for other and health maintenance.     Diagnoses and all orders for this visit:    Generalized anxiety disorder  -     sertraline (ZOLOFT) 100 MG tablet; Take 1.5 tablets by mouth daily      - increase Zoloft to 150 mg  - ok to try increasing Trazodone to 100 mg nightly  - consider adding Buspar or switching to Paxil    DISPOSITION    Return in about 4 weeks (around 11/5/2021) for HAYLEE. Bo Ford released without restrictions. PATIENT COUNSELING    Counseling was provided today regarding the following topics: Healthy eating habits, Regular exercise, substance abuse and healthy sleep habits. Annmarie received counseling on the following healthy behaviors: medication adherence    Patient given educational materials on: See Attached    I have instructed Bo Ford to complete a self tracking handout on none and instructed them to bring it with them to her next appointment. Barriers to learning and self management: none    Discussed use, benefit, and side effects of prescribed medications. Barriers to medication compliance addressed. All patient questions answered. Pt voiced understanding.        Electronically signed by DEL Marquez CNP on 10/8/2021 at 7:50 AM

## 2021-10-14 ENCOUNTER — NURSE ONLY (OUTPATIENT)
Dept: FAMILY MEDICINE CLINIC | Age: 26
End: 2021-10-14

## 2021-10-14 ENCOUNTER — TELEMEDICINE (OUTPATIENT)
Dept: FAMILY MEDICINE CLINIC | Age: 26
End: 2021-10-14
Payer: COMMERCIAL

## 2021-10-14 DIAGNOSIS — R05.9 COUGH: ICD-10-CM

## 2021-10-14 DIAGNOSIS — Z11.52 ENCOUNTER FOR SCREENING FOR COVID-19: Primary | ICD-10-CM

## 2021-10-14 DIAGNOSIS — Z20.822 CLOSE EXPOSURE TO COVID-19 VIRUS: ICD-10-CM

## 2021-10-14 DIAGNOSIS — Z20.822 CLOSE EXPOSURE TO COVID-19 VIRUS: Primary | ICD-10-CM

## 2021-10-14 PROCEDURE — G8428 CUR MEDS NOT DOCUMENT: HCPCS | Performed by: NURSE PRACTITIONER

## 2021-10-14 PROCEDURE — 99213 OFFICE O/P EST LOW 20 MIN: CPT | Performed by: NURSE PRACTITIONER

## 2021-10-14 RX ORDER — BENZONATATE 100 MG/1
100-200 CAPSULE ORAL 3 TIMES DAILY PRN
Qty: 42 CAPSULE | Refills: 0 | Status: SHIPPED | OUTPATIENT
Start: 2021-10-14 | End: 2021-10-18 | Stop reason: ALTCHOICE

## 2021-10-14 ASSESSMENT — ENCOUNTER SYMPTOMS
DIARRHEA: 0
NAUSEA: 0
CHEST TIGHTNESS: 1
SORE THROAT: 0
SHORTNESS OF BREATH: 0
TROUBLE SWALLOWING: 0
COUGH: 1
COLOR CHANGE: 0
EYE PAIN: 0
WHEEZING: 0
VOMITING: 0
EYE REDNESS: 0
ABDOMINAL PAIN: 0
RHINORRHEA: 0

## 2021-10-14 NOTE — LETTER
[address] [place] [address]  [address] [place] [address]. [facility] [address]  Phone: [phone]  Fax: [phone] [facility], APRN - CNP        October 14, 2021     Patient: Gregoria Downey   YOB: 1995   Date of Visit: 10/14/2021       To Whom it May Concern:    Ace Ramsey was seen in my clinic on 10/14/2021. She may return to work on 10/18/21. If you have any questions or concerns, please don't hesitate to call.     Sincerely,         Robert Rollins, DEL - CNP

## 2021-10-14 NOTE — PROGRESS NOTES
10/14/2021    TELEHEALTH EVALUATION -- Audio/Visual (During IYOUI-92 public health emergency)    HPI:    Cinthia Stevenson (:  1995) has requested an audio/video evaluation for the following concern(s):    URI Symptoms    HPI:      Symptoms have been present for 2 day(s). Symptoms are unchanged since they initially started. Fever? No  Runny nose or congestion? Yes   Cough? Yes - hurts in her rib cage, some chest tightness  Sore throat? No  Headache, fatigue, joint pains, muscle aches? No  Shortness of breath/Wheezing? No  Nausea/Vomiting/Diarrhea? No  Double Sickening? No  Sick contacts? Yes - coworker sitting next to her was positive. Patient has tried cold and flu meds with mild improvement. Review of Systems   Constitutional: Negative for chills, diaphoresis and fatigue. HENT: Negative for congestion, rhinorrhea, sore throat and trouble swallowing. Eyes: Negative for pain, redness and visual disturbance. Respiratory: Positive for cough and chest tightness. Negative for shortness of breath and wheezing. Cardiovascular: Negative for chest pain, palpitations and leg swelling. Gastrointestinal: Negative for abdominal pain, diarrhea, nausea and vomiting. Endocrine: Negative for polydipsia, polyphagia and polyuria. Genitourinary: Negative for decreased urine volume, dysuria, frequency and urgency. Musculoskeletal: Negative for arthralgias and myalgias. Skin: Negative for color change and rash. Allergic/Immunologic: Negative for environmental allergies, food allergies and immunocompromised state. Neurological: Negative for dizziness, tremors, syncope and headaches. Hematological: Negative for adenopathy. Psychiatric/Behavioral: Negative for behavioral problems, confusion, self-injury and suicidal ideas. All other systems reviewed and are negative. Prior to Visit Medications    Medication Sig Taking?  Authorizing Provider   benzonatate (TESSALON) 100 MG capsule Take 1-2 capsules by mouth 3 times daily as needed for Cough Yes DEL Vazquez CNP   sertraline (ZOLOFT) 100 MG tablet Take 1.5 tablets by mouth daily  DEL Vazquez CNP   SUMAtriptan (IMITREX) 100 MG tablet Take 1 tablet by mouth once as needed for Migraine  DEL Vazquez CNP   traZODone (DESYREL) 50 MG tablet Take 1 tablet by mouth nightly as needed for Sleep  DEL Vazquez CNP   tiZANidine (ZANAFLEX) 4 MG tablet Take 1 tablet by mouth every 8 hours as needed (muscle cramps)  DEL Vazquez CNP   ibuprofen (ADVIL;MOTRIN) 800 MG tablet Take 1 tablet by mouth 4 times daily as needed for Pain  DEL Vazquez CNP   baclofen (LIORESAL) 10 MG tablet Take 1/2 tablet to 1 full tablet by mouth TID for back muscle spasms  Patient taking differently: Take 1/2 tablet to 1 full tablet by mouth TID for back muscle spasms. AS NEEDED  DEL Vazquez CNP   fluticasone (FLONASE) 50 MCG/ACT nasal spray 1 spray by Nasal route daily for 7 days  Patient taking differently: 1 spray by Nasal route daily As needed  Cathlene BrandsDEL CNP   Norgestim-Eth Estrad Triphasic (TRI-SPRINTEC) 0.18/0.215/0.25 MG-35 MCG TABS Take 1 tablet by mouth daily  DEL Vazquez CNP   diphenhydrAMINE (BENADRYL) 25 MG capsule Take 25 mg by mouth every 6 hours as needed for Itching  Historical Provider, MD   Aspirin-Acetaminophen-Caffeine (EXCEDRIN MIGRAINE PO) Take 2 tablets by mouth AS NEEDED  Historical Provider, MD       Social History     Tobacco Use    Smoking status: Never Smoker    Smokeless tobacco: Never Used   Substance Use Topics    Alcohol use: Yes     Comment: occas.     Drug use: No        Allergies   Allergen Reactions    Pollen Extract Itching and Other (See Comments)     Sinus issues    Pcn [Penicillins] Rash   , No past medical history on file.,   Past Surgical History:   Procedure Laterality Date    WISDOM TOOTH EXTRACTION     ,   Family History   Problem Relation Age of Onset    No Known Problems Mother     No Known Problems Father    ,   Immunization History   Administered Date(s) Administered    COVID-19, Moderna, PF, 100mcg/0.5mL 01/11/2021, 02/15/2021   ,   Health Maintenance   Topic Date Due    Hepatitis C screen  Never done    Varicella vaccine (1 of 2 - 2-dose childhood series) Never done    HPV vaccine (1 - 2-dose series) Never done    HIV screen  Never done    Pap smear  07/18/2021    Flu vaccine (1) 09/01/2021    DTaP/Tdap/Td vaccine (7 - Td or Tdap) 10/05/2028    Hepatitis B vaccine  Completed    Hib vaccine  Completed    COVID-19 Vaccine  Completed    Hepatitis A vaccine  Aged Out    Meningococcal (ACWY) vaccine  Aged Out    Pneumococcal 0-64 years Vaccine  Aged Out       PHYSICAL EXAMINATION:  [ INSTRUCTIONS:  \"[x]\" Indicates a positive item  \"[]\" Indicates a negative item  -- DELETE ALL ITEMS NOT EXAMINED]  Vital Signs: (As obtained by patient/caregiver or practitioner observation)    Blood pressure-  Heart rate-    Respiratory rate-    Temperature-  Pulse oximetry-     Constitutional: [x] Appears well-developed and well-nourished [x] No apparent distress      [] Abnormal-   Mental status  [x] Alert and awake  [x] Oriented to person/place/time [x]Able to follow commands      Eyes:  EOM    [x]  Normal  [] Abnormal-  Sclera  [x]  Normal  [] Abnormal -         Discharge [x]  None visible  [] Abnormal -    HENT:   [x] Normocephalic, atraumatic.   [] Abnormal   [x] Mouth/Throat: Mucous membranes are moist.     External Ears [x] Normal  [] Abnormal-     Neck: [x] No visualized mass     Pulmonary/Chest: [x] Respiratory effort normal.  [x] No visualized signs of difficulty breathing or respiratory distress        [] Abnormal-      Musculoskeletal:   [x] Normal gait with no signs of ataxia         [x] Normal range of motion of neck        [] Abnormal-       Neurological:        [x] No Facial Asymmetry (Cranial nerve 7 motor function) (limited exam to video visit)          [x] No gaze palsy        [] Abnormal-         Skin:        [x] No significant exanthematous lesions or discoloration noted on facial skin         [] Abnormal-            Psychiatric:       [x] Normal Affect [x] No Hallucinations        [] Abnormal-     Other pertinent observable physical exam findings-     ASSESSMENT & PLAN  Lizzeth Sevilla was seen today for cough. Diagnoses and all orders for this visit:    Close exposure to COVID-19 virus  -     COVID-19; Future    Cough  -     COVID-19; Future  -     benzonatate (TESSALON) 100 MG capsule; Take 1-2 capsules by mouth 3 times daily as needed for Cough      - had rapid covid which was negative, will repeat via PCR  - tessalon perles for cough  - ok to RTW Monday as long as repeat COVID negative    Return if symptoms worsen or fail to improve. Iveth Hirsch is a 23 y.o. female being evaluated by a Virtual Visit (video visit) encounter to address concerns as mentioned above. A caregiver was present when appropriate. Due to this being a TeleHealth encounter (During [place] [address] public health emergency), evaluation of the following organ systems was limited: Vitals/Constitutional/EENT/Resp/CV/GI//MS/Neuro/Skin/Heme-Lymph-Imm. Pursuant to the emergency declaration under the [address] [place] [address] authority and the [employer] Act, this Virtual Visit was conducted with patient's (and/or legal guardian's) consent, to reduce the patient's risk of exposure to COVID-19 and provide necessary medical care. The patient (and/or legal guardian) has also been advised to contact this office for worsening conditions or problems, and seek emergency medical treatment and/or call 911 if deemed necessary. Services were provided through a video synchronous discussion virtually to substitute for in-person clinic visit.  Patient and provider were located at their individual homes. --Grace Mendez, DEL - CNP on 10/14/2021 at 3:07 PM    An electronic signature was used to authenticate this note.

## 2021-10-16 DIAGNOSIS — U07.1 COVID: Primary | ICD-10-CM

## 2021-10-16 LAB
SARS-COV-2: DETECTED
SOURCE: ABNORMAL

## 2021-10-16 RX ORDER — BENZONATATE 200 MG/1
200 CAPSULE ORAL 3 TIMES DAILY PRN
Qty: 30 CAPSULE | Refills: 0 | Status: SHIPPED | OUTPATIENT
Start: 2021-10-16 | End: 2021-10-18 | Stop reason: ALTCHOICE

## 2021-10-16 RX ORDER — AZITHROMYCIN 250 MG/1
TABLET, FILM COATED ORAL
Qty: 6 TABLET | Refills: 0 | Status: SHIPPED | OUTPATIENT
Start: 2021-10-16 | End: 2021-10-26

## 2021-10-16 RX ORDER — PREDNISONE 10 MG/1
10 TABLET ORAL 2 TIMES DAILY
Qty: 10 TABLET | Refills: 0 | Status: SHIPPED | OUTPATIENT
Start: 2021-10-16 | End: 2021-10-21

## 2021-10-18 ENCOUNTER — HOSPITAL ENCOUNTER (OUTPATIENT)
Dept: GENERAL RADIOLOGY | Age: 26
Discharge: HOME | End: 2021-10-18
Payer: COMMERCIAL

## 2021-10-18 ENCOUNTER — OFFICE VISIT (OUTPATIENT)
Dept: FAMILY MEDICINE CLINIC | Age: 26
End: 2021-10-18
Payer: COMMERCIAL

## 2021-10-18 ENCOUNTER — HOSPITAL ENCOUNTER (OUTPATIENT)
Age: 26
Discharge: HOME | End: 2021-10-18
Payer: COMMERCIAL

## 2021-10-18 ENCOUNTER — TELEPHONE (OUTPATIENT)
Dept: FAMILY MEDICINE CLINIC | Age: 26
End: 2021-10-18

## 2021-10-18 VITALS
DIASTOLIC BLOOD PRESSURE: 86 MMHG | TEMPERATURE: 98 F | HEART RATE: 86 BPM | OXYGEN SATURATION: 98 % | HEIGHT: 68 IN | RESPIRATION RATE: 14 BRPM | WEIGHT: 238 LBS | SYSTOLIC BLOOD PRESSURE: 124 MMHG | BODY MASS INDEX: 36.07 KG/M2

## 2021-10-18 DIAGNOSIS — R05.9 COUGH: ICD-10-CM

## 2021-10-18 DIAGNOSIS — U07.1 ACUTE BRONCHITIS DUE TO COVID-19 VIRUS: Primary | ICD-10-CM

## 2021-10-18 DIAGNOSIS — J20.8 ACUTE BRONCHITIS DUE TO COVID-19 VIRUS: ICD-10-CM

## 2021-10-18 DIAGNOSIS — U07.1 ACUTE BRONCHITIS DUE TO COVID-19 VIRUS: ICD-10-CM

## 2021-10-18 DIAGNOSIS — J20.8 ACUTE BRONCHITIS DUE TO COVID-19 VIRUS: Primary | ICD-10-CM

## 2021-10-18 PROCEDURE — G8484 FLU IMMUNIZE NO ADMIN: HCPCS | Performed by: NURSE PRACTITIONER

## 2021-10-18 PROCEDURE — G8427 DOCREV CUR MEDS BY ELIG CLIN: HCPCS | Performed by: NURSE PRACTITIONER

## 2021-10-18 PROCEDURE — 1036F TOBACCO NON-USER: CPT | Performed by: NURSE PRACTITIONER

## 2021-10-18 PROCEDURE — 71046 X-RAY EXAM CHEST 2 VIEWS: CPT

## 2021-10-18 PROCEDURE — G8417 CALC BMI ABV UP PARAM F/U: HCPCS | Performed by: NURSE PRACTITIONER

## 2021-10-18 PROCEDURE — 99213 OFFICE O/P EST LOW 20 MIN: CPT | Performed by: NURSE PRACTITIONER

## 2021-10-18 RX ORDER — PROMETHAZINE HYDROCHLORIDE AND CODEINE PHOSPHATE 6.25; 1 MG/5ML; MG/5ML
5 SYRUP ORAL 4 TIMES DAILY PRN
Qty: 80 ML | Refills: 0 | Status: SHIPPED | OUTPATIENT
Start: 2021-10-18 | End: 2021-10-19 | Stop reason: SDUPTHER

## 2021-10-18 NOTE — LETTER
[address] [place] [address]  [address] [place] [address]. [facility] [address]  Phone: [phone]  Fax: [address] [facility], DEL - CNP        October 18, 2021     Patient: Daylin Ruffin   YOB: 1995   Date of Visit: 10/18/2021       To Whom it May Concern:    Gerardo Espana was seen in my clinic on 10/18/2021. She may return to work on 10/25/21. She missed work from 10/14-10/24 due to quarantine for [employer]. If you have any questions or concerns, please don't hesitate to call.     Sincerely,         DEL Smith - KAREN

## 2021-10-18 NOTE — TELEPHONE ENCOUNTER
----- Message from DEL Braden CNP sent at 10/18/2021  8:08 AM EDT -----  Let Kareem know her covid test was positive. She can return to work this Friday 10/22. Is she doing ok? Able to manage symptoms alright on her own?

## 2021-10-18 NOTE — PROGRESS NOTES
SUBJECTIVE:  Parmjit Matta is a 23 y.o. y/o female that presents with Cough (started Thursday of last week. Sometimes productive ), Other (no taste and smell. Short of breath when walking a short distance ), Diarrhea (Started today ), and Health Maintenance ( no recent pap)    HPI:      Symptoms have been present for 5 day(s). Symptoms are unchanged since they initially started. Fever? No  Runny nose or congestion? Yes   Cough? Yes - productive cough  Sore throat? No  Headache, fatigue, joint pains, muscle aches? No  Shortness of breath/Wheezing? Yes - with activity  Nausea/Vomiting/Diarrhea? Yes - some diarrhea  Double Sickening? No  Sick contacts? Yes  Known COVID exposures of RFs? Yes  Smoker? No  Preexisting Respiratory conditions? No    Patient has tried [place] Patches, perles and prednisone without improvement. History reviewed. No pertinent past medical history. Social History     Socioeconomic History    Marital status: Single     Spouse name: Not on file    Number of children: Not on file    Years of education: Not on file    Highest education level: Not on file   Occupational History    Not on file   Tobacco Use    Smoking status: Never Smoker    Smokeless tobacco: Never Used   Substance and Sexual Activity    Alcohol use: Yes     Comment: occas.  Drug use: No    Sexual activity: Never   Other Topics Concern    Not on file   Social History Narrative    Not on file     Social Determinants of Health     Financial Resource Strain: Low Risk     Difficulty of Paying Living Expenses: Not very hard   Food Insecurity: No Food Insecurity    Worried About Running Out of Food in the Last Year: Never true    Nick of Food in the Last Year: Never true   Transportation Needs:     Lack of Transportation (Medical):      Lack of Transportation (Non-Medical):    Physical Activity:     Days of Exercise per Week:     Minutes of Exercise per Session:    Stress:     Feeling of Stress :    Social Connections:     Frequency of Communication with Friends and Family:     Frequency of Social Gatherings with Friends and Family:     Attends Hindu Services:     Active Member of Clubs or Organizations:     Attends Club or Organization Meetings:     Marital Status:    Intimate Partner Violence:     Fear of Current or Ex-Partner:     Emotionally Abused:     Physically Abused:     Sexually Abused:        Family History   Problem Relation Age of Onset    No Known Problems Mother     No Known Problems Father            OBJECTIVE:  /86   Pulse 86   Temp 98 °F (36.7 °C) (Oral)   Resp 14   Ht 5' 7.5\" (1.715 m)   Wt 238 lb (108 kg)   LMP 10/05/2021 (Exact Date)   SpO2 98%   BMI 36.73 kg/m²   General appearance: alert, well appearing, and in no distress. ENT exam reveals - ENT exam normal, no neck nodes or sinus tenderness. CVS exam: normal rate, regular rhythm, normal S1, S2, no murmurs, rubs, clicks or gallops. Chest:clear to auscultation, no wheezes, rales or rhonchi, symmetric air entry, harsh barky cough. Abdominal exam: soft, nontender, nondistended, no masses or organomegaly. Extremities:  No clubbing, cyanosis or edema  Skin exam - normal coloration and turgor, no rashes, no suspicious skin lesions noted. Psych -  Affect appropriate. Thought process is normal without evidence of depression or psychosis. Good insight and appropriae interaction. Cognition and memory appear to be intact. ASSESSMENT & PLAN  Floridaxavier Hammond was seen today for cough, other, diarrhea and health maintenance. Diagnoses and all orders for this visit:    Acute bronchitis due to COVID-19 virus  -     XR CHEST STANDARD (2 VW); Future  -     promethazine-codeine (PHENERGAN WITH CODEINE) 6.25-10 MG/5ML syrup; Take 5 mLs by mouth 4 times daily as needed for Cough for up to 4 days. Cough  -     promethazine-codeine (PHENERGAN WITH CODEINE) 6.25-10 MG/5ML syrup;  Take 5 mLs by mouth 4 times daily as needed for Cough for up to 4 days. - positive for COVID  - obtain chest xray   - con't Zpak, prednisone  - add phenergan with codeine  - work slip  Controlled Substance Monitoring:    Acute and Chronic Pain Monitoring:   RX Monitoring 10/18/2021   Periodic Controlled Substance Monitoring No signs of potential drug abuse or diversion identified. Return if symptoms worsen or fail to improve.     -Patient advised to call immediately or go to ER if any worsening of symptoms  -Patient counseled on conservative care including fluids, rest and OTC meds    Annmarie received counseling on the following healthy behaviors: medication adherence  Reviewed prior labs and health maintenance. Continue current medications, diet and exercise. Discussed use, benefit, and side effects of prescribed medications. Barriers to medication compliance addressed. Patient given educational materials - see patient instructions. All patient questions answered. Patient voiced understanding. I have reviewed this patient's history, habits, and medication list and have updated the chart where appropriate.

## 2021-10-19 ENCOUNTER — TELEPHONE (OUTPATIENT)
Dept: FAMILY MEDICINE CLINIC | Age: 26
End: 2021-10-19

## 2021-10-19 DIAGNOSIS — U07.1 ACUTE BRONCHITIS DUE TO COVID-19 VIRUS: ICD-10-CM

## 2021-10-19 DIAGNOSIS — J20.8 ACUTE BRONCHITIS DUE TO COVID-19 VIRUS: ICD-10-CM

## 2021-10-19 DIAGNOSIS — R05.9 COUGH: ICD-10-CM

## 2021-10-19 RX ORDER — PROMETHAZINE HYDROCHLORIDE AND CODEINE PHOSPHATE 6.25; 1 MG/5ML; MG/5ML
5 SYRUP ORAL 4 TIMES DAILY PRN
Qty: 80 ML | Refills: 0 | Status: SHIPPED | OUTPATIENT
Start: 2021-10-19 | End: 2021-10-23

## 2021-10-19 NOTE — TELEPHONE ENCOUNTER
----- Message from [place] sent at 10/18/2021  5:59 PM EDT -----  Subject: Medication Problem    QUESTIONS  Name of Medication? promethazine-codeine (PHENERGAN WITH CODEINE) 6.25-10   MG/5ML syrup  Patient-reported dosage and instructions? 6.25-10 MG/5ML syrup 4 times   daily as needed   What question or problem do you have with the medication? Pt was unable to   get prescription filled. Pharmacy stated she had to have it electronically   sent in. Preferred Pharmacy? [address] [facility]  Pharmacy phone number (if available)? [phone]  Additional Information for Provider?   ---------------------------------------------------------------------------  --------------  [address]  What is the best way for the office to contact you? OK to leave message on   voicemail  Preferred Call Back Phone Number? [phone]  ---------------------------------------------------------------------------  --------------  SCRIPT ANSWERS  Relationship to Patient?  Self

## 2021-10-29 ENCOUNTER — OFFICE VISIT (OUTPATIENT)
Dept: FAMILY MEDICINE CLINIC | Age: 26
End: 2021-10-29
Payer: COMMERCIAL

## 2021-10-29 ENCOUNTER — PATIENT MESSAGE (OUTPATIENT)
Dept: FAMILY MEDICINE CLINIC | Age: 26
End: 2021-10-29

## 2021-10-29 VITALS
SYSTOLIC BLOOD PRESSURE: 118 MMHG | TEMPERATURE: 98 F | DIASTOLIC BLOOD PRESSURE: 82 MMHG | BODY MASS INDEX: 37.16 KG/M2 | HEART RATE: 90 BPM | WEIGHT: 245.2 LBS | OXYGEN SATURATION: 98 % | RESPIRATION RATE: 14 BRPM | HEIGHT: 68 IN

## 2021-10-29 DIAGNOSIS — R09.82 PND (POST-NASAL DRIP): Primary | ICD-10-CM

## 2021-10-29 PROCEDURE — G8427 DOCREV CUR MEDS BY ELIG CLIN: HCPCS | Performed by: NURSE PRACTITIONER

## 2021-10-29 PROCEDURE — G8484 FLU IMMUNIZE NO ADMIN: HCPCS | Performed by: NURSE PRACTITIONER

## 2021-10-29 PROCEDURE — 1036F TOBACCO NON-USER: CPT | Performed by: NURSE PRACTITIONER

## 2021-10-29 PROCEDURE — G8417 CALC BMI ABV UP PARAM F/U: HCPCS | Performed by: NURSE PRACTITIONER

## 2021-10-29 PROCEDURE — 99213 OFFICE O/P EST LOW 20 MIN: CPT | Performed by: NURSE PRACTITIONER

## 2021-10-29 RX ORDER — FLUTICASONE PROPIONATE 50 MCG
2 SPRAY, SUSPENSION (ML) NASAL DAILY
Qty: 1 EACH | Refills: 0 | Status: SHIPPED | OUTPATIENT
Start: 2021-10-29

## 2021-10-29 RX ORDER — LORATADINE AND PSEUDOEPHEDRINE SULFATE 10; 240 MG/1; MG/1
1 TABLET, EXTENDED RELEASE ORAL DAILY
Qty: 14 TABLET | Refills: 0 | Status: SHIPPED | OUTPATIENT
Start: 2021-10-29

## 2021-10-29 NOTE — PROGRESS NOTES
SUBJECTIVE:  Enoch Alfaro is a 23 y.o. y/o female that presents with Follow-up (lingering cough. comes and goes. makes her chest hurt from coughing so much)    HPI:      Symptoms have been present for 2 weeks  Symptoms are unchanged since they initially started. Fever? No  Runny nose or congestion? Yes   Cough? Yes - nonproductive cough, cough is mostly tickle in the throat  Sore throat? No  Headache, fatigue, joint pains, muscle aches? No  Shortness of breath/Wheezing? Yes - with activity  Nausea/Vomiting/Diarrhea? Yes - some diarrhea  Double Sickening? No  Sick contacts? Yes  Known COVID exposures of RFs? Yes  Smoker? No  Preexisting Respiratory conditions? No    Patient has tried Terese Prima, perles and prednisone without improvement. She is taking cough drops, nyquil dayquil. No past medical history on file. Social History     Socioeconomic History    Marital status: Single     Spouse name: Not on file    Number of children: Not on file    Years of education: Not on file    Highest education level: Not on file   Occupational History    Not on file   Tobacco Use    Smoking status: Never Smoker    Smokeless tobacco: Never Used   Substance and Sexual Activity    Alcohol use: Yes     Comment: occas.  Drug use: No    Sexual activity: Never   Other Topics Concern    Not on file   Social History Narrative    Not on file     Social Determinants of Health     Financial Resource Strain: Low Risk     Difficulty of Paying Living Expenses: Not very hard   Food Insecurity: No Food Insecurity    Worried About Running Out of Food in the Last Year: Never true    Nick of Food in the Last Year: Never true   Transportation Needs:     Lack of Transportation (Medical):      Lack of Transportation (Non-Medical):    Physical Activity:     Days of Exercise per Week:     Minutes of Exercise per Session:    Stress:     Feeling of Stress :    Social Connections:     Frequency of Communication with Friends and Family:     Frequency of Social Gatherings with Friends and Family:     Attends Zoroastrianism Services:     Active Member of Clubs or Organizations:     Attends Club or Organization Meetings:     Marital Status:    Intimate Partner Violence:     Fear of Current or Ex-Partner:     Emotionally Abused:     Physically Abused:     Sexually Abused:        Family History   Problem Relation Age of Onset    No Known Problems Mother     No Known Problems Father            OBJECTIVE:  /82   Pulse 90   Temp 98 °F (36.7 °C) (Oral)   Resp 14   Ht 5' 7.5\" (1.715 m)   Wt 245 lb 3.2 oz (111.2 kg)   LMP 10/05/2021 (Exact Date)   SpO2 98%   BMI 37.84 kg/m²   General appearance: alert, well appearing, and in no distress. ENT exam reveals - ENT exam normal, no neck nodes or sinus tenderness. CVS exam: normal rate, regular rhythm, normal S1, S2, no murmurs, rubs, clicks or gallops. Chest:clear to auscultation, no wheezes, rales or rhonchi, symmetric air entry, harsh barky cough. Abdominal exam: soft, nontender, nondistended, no masses or organomegaly. Extremities:  No clubbing, cyanosis or edema  Skin exam - normal coloration and turgor, no rashes, no suspicious skin lesions noted. Psych -  Affect appropriate. Thought process is normal without evidence of depression or psychosis. Good insight and appropriae interaction. Cognition and memory appear to be intact. ASSESSMENT & PLAN  Neal Rizvi was seen today for follow-up. Diagnoses and all orders for this visit:    PND (post-nasal drip)  -     fluticasone (FLONASE) 50 MCG/ACT nasal spray; 2 sprays by Nasal route daily  -     loratadine-pseudoephedrine (CLARITIN-D 24 HOUR)  MG per extended release tablet;  Take 1 tablet by mouth daily      - symptoms more consistent with PND  - no need for Abx at this point  - treat with Flonase and Claritin D    Controlled Substance Monitoring:    Acute and Chronic Pain Monitoring:   RX Monitoring 10/18/2021   Periodic Controlled Substance Monitoring No signs of potential drug abuse or diversion identified. Return if symptoms worsen or fail to improve.     -Patient advised to call immediately or go to ER if any worsening of symptoms  -Patient counseled on conservative care including fluids, rest and OTC meds    Annmarie received counseling on the following healthy behaviors: medication adherence  Reviewed prior labs and health maintenance. Continue current medications, diet and exercise. Discussed use, benefit, and side effects of prescribed medications. Barriers to medication compliance addressed. Patient given educational materials - see patient instructions. All patient questions answered. Patient voiced understanding. I have reviewed this patient's history, habits, and medication list and have updated the chart where appropriate.

## 2021-10-29 NOTE — TELEPHONE ENCOUNTER
Can we call her and see if she can come in? She is already out of quarantine for COVID, so regular office visit is fine.

## 2021-10-29 NOTE — TELEPHONE ENCOUNTER
From: Rosas Wagoner  To: Richard Barrios, APRN - CNP  Sent: 10/29/2021 9:42 AM EDT  Subject: Non-Urgent Medical Question    Good Morning,   I had a bad cough for over a week and I am coughing pretty bad. Whey else can I do?   Thanks  Kareem

## 2021-11-08 ENCOUNTER — VIRTUAL VISIT (OUTPATIENT)
Dept: FAMILY MEDICINE CLINIC | Age: 26
End: 2021-11-08
Payer: COMMERCIAL

## 2021-11-08 DIAGNOSIS — F51.04 PSYCHOPHYSIOLOGIC INSOMNIA: ICD-10-CM

## 2021-11-08 DIAGNOSIS — F41.1 GENERALIZED ANXIETY DISORDER: Primary | ICD-10-CM

## 2021-11-08 PROCEDURE — 99213 OFFICE O/P EST LOW 20 MIN: CPT | Performed by: NURSE PRACTITIONER

## 2021-11-08 PROCEDURE — G8428 CUR MEDS NOT DOCUMENT: HCPCS | Performed by: NURSE PRACTITIONER

## 2021-11-08 RX ORDER — TRAZODONE HYDROCHLORIDE 50 MG/1
50 TABLET ORAL NIGHTLY PRN
Qty: 90 TABLET | Refills: 0 | Status: SHIPPED | OUTPATIENT
Start: 2021-11-08 | End: 2022-04-29 | Stop reason: SDUPTHER

## 2021-11-08 ASSESSMENT — ENCOUNTER SYMPTOMS
ABDOMINAL PAIN: 0
WHEEZING: 0
RHINORRHEA: 0
DIARRHEA: 0
EYE PAIN: 0
SHORTNESS OF BREATH: 0
SORE THROAT: 0
TROUBLE SWALLOWING: 0
NAUSEA: 0
VOMITING: 0
COUGH: 0
EYE REDNESS: 0
COLOR CHANGE: 0

## 2021-11-08 NOTE — PROGRESS NOTES
2021    TELEHEALTH EVALUATION -- Audio/Visual (During BSX-23 public health emergency)    HPI:    Nara Silva (:  1995) has requested an audio/video evaluation for the following concern(s):    HAYLEE   Anxiety is doing a little better since last visit. She is taking 150 mg Zoloft. She does feel like it's manageable currently. No panic attacks or meltdowns. Sleep is still not good though. She is waking up a lot, has a hard time falling asleep too. She has tried Trazodone and it does help a little. Review of Systems   Constitutional: Negative for chills, diaphoresis and fatigue. HENT: Negative for congestion, rhinorrhea, sore throat and trouble swallowing. Eyes: Negative for pain, redness and visual disturbance. Respiratory: Negative for cough, shortness of breath and wheezing. Cardiovascular: Negative for chest pain, palpitations and leg swelling. Gastrointestinal: Negative for abdominal pain, diarrhea, nausea and vomiting. Endocrine: Negative for polydipsia, polyphagia and polyuria. Genitourinary: Negative for decreased urine volume, dysuria, frequency and urgency. Musculoskeletal: Negative for arthralgias and myalgias. Skin: Negative for color change and rash. Allergic/Immunologic: Negative for environmental allergies, food allergies and immunocompromised state. Neurological: Negative for dizziness, tremors, syncope and headaches. Hematological: Negative for adenopathy. Psychiatric/Behavioral: Positive for sleep disturbance. Negative for behavioral problems, confusion, self-injury and suicidal ideas. All other systems reviewed and are negative. Prior to Visit Medications    Medication Sig Taking?  Authorizing Provider   traZODone (DESYREL) 50 MG tablet Take 1 tablet by mouth nightly as needed for Sleep Yes DEL Alva - CNP   fluticasone (FLONASE) 50 MCG/ACT nasal spray 2 sprays by Nasal route daily  DEL Alva - KAREN loratadine-pseudoephedrine (CLARITIN-D 24 HOUR)  MG per extended release tablet Take 1 tablet by mouth daily  DEL Dong CNP   sertraline (ZOLOFT) 100 MG tablet Take 1.5 tablets by mouth daily  RefDEL Dailey CNP   SUMAtriptan (IMITREX) 100 MG tablet Take 1 tablet by mouth once as needed for Migraine  RefDEL Daiely CNP   tiZANidine (ZANAFLEX) 4 MG tablet Take 1 tablet by mouth every 8 hours as needed (muscle cramps)  RefDEL Dailey CNP   ibuprofen (ADVIL;MOTRIN) 800 MG tablet Take 1 tablet by mouth 4 times daily as needed for Pain  RefDEL Dailey CNP   baclofen (LIORESAL) 10 MG tablet Take 1/2 tablet to 1 full tablet by mouth TID for back muscle spasms  Patient taking differently: Take 1/2 tablet to 1 full tablet by mouth TID for back muscle spasms. AS NEEDED  RefDEL Dailey CNP   Norgestim-Eth Dustin  Triphasic (TRI-SPRINTEC) 0.18/0.215/0.25 MG-35 MCG TABS Take 1 tablet by mouth daily  EDL Dong CNP   diphenhydrAMINE (BENADRYL) 25 MG capsule Take 25 mg by mouth every 6 hours as needed for Itching  Historical Provider, MD   Aspirin-Acetaminophen-Caffeine (EXCEDRIN MIGRAINE PO) Take 2 tablets by mouth AS NEEDED  Historical Provider, MD       Social History     Tobacco Use    Smoking status: Never Smoker    Smokeless tobacco: Never Used   Substance Use Topics    Alcohol use: Yes     Comment: occas.     Drug use: No        Allergies   Allergen Reactions    Pollen Extract Itching and Other (See Comments)     Sinus issues    Pcn [Penicillins] Rash   , No past medical history on file.,   Past Surgical History:   Procedure Laterality Date    WISDOM TOOTH EXTRACTION     ,   Family History   Problem Relation Age of Onset    No Known Problems Mother     No Known Problems Father    , There is no immunization history for the selected administration types on file for this patient.,   Health Maintenance   Topic Date Due    Hepatitis C screen  Never done    Varicella vaccine (1 of 2 - 2-dose childhood series) Never done    HPV vaccine (1 - 2-dose series) Never done    COVID-19 Vaccine (1) Never done    HIV screen  Never done    Pap smear  07/18/2021    Flu vaccine (1) 09/01/2021    DTaP/Tdap/Td vaccine (7 - Td or Tdap) 10/05/2028    Hepatitis B vaccine  Completed    Hib vaccine  Completed    Hepatitis A vaccine  Aged Out    Meningococcal (ACWY) vaccine  Aged Out    Pneumococcal 0-64 years Vaccine  Aged Out       PHYSICAL EXAMINATION:  [ INSTRUCTIONS:  \"[x]\" Indicates a positive item  \"[]\" Indicates a negative item  -- DELETE ALL ITEMS NOT EXAMINED]  Vital Signs: (As obtained by patient/caregiver or practitioner observation)    Blood pressure-  Heart rate-    Respiratory rate-    Temperature-  Pulse oximetry-     Constitutional: [x] Appears well-developed and well-nourished [x] No apparent distress      [] Abnormal-   Mental status  [x] Alert and awake  [x] Oriented to person/place/time [x]Able to follow commands      Eyes:  EOM    [x]  Normal  [] Abnormal-  Sclera  [x]  Normal  [] Abnormal -         Discharge [x]  None visible  [] Abnormal -    HENT:   [x] Normocephalic, atraumatic.   [] Abnormal   [x] Mouth/Throat: Mucous membranes are moist.     External Ears [x] Normal  [] Abnormal-     Neck: [x] No visualized mass     Pulmonary/Chest: [x] Respiratory effort normal.  [x] No visualized signs of difficulty breathing or respiratory distress        [] Abnormal-      Musculoskeletal:   [x] Normal gait with no signs of ataxia         [x] Normal range of motion of neck        [] Abnormal-       Neurological:        [x] No Facial Asymmetry (Cranial nerve 7 motor function) (limited exam to video visit)          [x] No gaze palsy        [] Abnormal-         Skin:        [x] No significant exanthematous lesions or discoloration noted on facial skin         [] Abnormal-            Psychiatric:       [x] Normal Affect [x] No Hallucinations        [] Abnormal- Other pertinent observable physical exam findings-     ASSESSMENT & Lizette Lutz was seen today for anxiety. Diagnoses and all orders for this visit:    Generalized anxiety disorder  -     traZODone (DESYREL) 50 MG tablet; Take 1 tablet by mouth nightly as needed for Sleep    Psychophysiologic insomnia  -     traZODone (DESYREL) 50 MG tablet; Take 1 tablet by mouth nightly as needed for Sleep      - con't Zoloft 150 mg  - refill trazodone, will have her try to be more consistent with taking it, it may also help some with anxiety and sleep issues    Return if symptoms worsen or fail to improve. Enoch Alfaro is a 23 y.o. female being evaluated by a Virtual Visit (video visit) encounter to address concerns as mentioned above. A caregiver was present when appropriate. Due to this being a TeleHealth encounter (During LRBZL-84 public health emergency), evaluation of the following organ systems was limited: Vitals/Constitutional/EENT/Resp/CV/GI//MS/Neuro/Skin/Heme-Lymph-Imm. Pursuant to the emergency declaration under the [facility], [address] [place] [address] authority and the [employer] Act, this Virtual Visit was conducted with patient's (and/or legal guardian's) consent, to reduce the patient's risk of exposure to COVID-19 and provide necessary medical care. The patient (and/or legal guardian) has also been advised to contact this office for worsening conditions or problems, and seek emergency medical treatment and/or call 911 if deemed necessary. Services were provided through a video synchronous discussion virtually to substitute for in-person clinic visit. Patient and provider were located at their individual homes. --DEL Brumfield - CNP on 11/8/2021 at 4:11 PM    An electronic signature was used to authenticate this note.

## 2021-11-16 ENCOUNTER — E-VISIT (OUTPATIENT)
Dept: FAMILY MEDICINE CLINIC | Age: 26
End: 2021-11-16
Payer: COMMERCIAL

## 2021-11-16 DIAGNOSIS — J01.90 ACUTE RHINOSINUSITIS: Primary | ICD-10-CM

## 2021-11-16 PROCEDURE — 99421 OL DIG E/M SVC 5-10 MIN: CPT | Performed by: NURSE PRACTITIONER

## 2021-11-16 RX ORDER — CEFUROXIME AXETIL 250 MG/1
250 TABLET ORAL 2 TIMES DAILY
Qty: 20 TABLET | Refills: 0 | Status: SHIPPED | OUTPATIENT
Start: 2021-11-16 | End: 2021-12-01 | Stop reason: SDUPTHER

## 2021-11-16 RX ORDER — BROMPHENIRAMINE MALEATE, PSEUDOEPHEDRINE HYDROCHLORIDE, AND DEXTROMETHORPHAN HYDROBROMIDE 2; 30; 10 MG/5ML; MG/5ML; MG/5ML
5 SYRUP ORAL 4 TIMES DAILY PRN
Qty: 60 ML | Refills: 0 | Status: SHIPPED | OUTPATIENT
Start: 2021-11-16 | End: 2021-12-01 | Stop reason: SDUPTHER

## 2021-11-16 ASSESSMENT — LIFESTYLE VARIABLES: SMOKING_STATUS: NO, I HAVE NEVER SMOKED

## 2021-12-18 ENCOUNTER — PATIENT MESSAGE (OUTPATIENT)
Dept: FAMILY MEDICINE CLINIC | Age: 26
End: 2021-12-18

## 2021-12-18 DIAGNOSIS — F41.1 GENERALIZED ANXIETY DISORDER: Primary | ICD-10-CM

## 2021-12-20 NOTE — TELEPHONE ENCOUNTER
From: Al Peer  To: Dayana Clifton  Sent: 12/18/2021 6:48 PM EST  Subject: Counselor    Hi,    I was wondering if there is any way, a referral could be put in for me to go talk with a counselor or therapist. Just need someone to talk to about things.      Thank you

## 2022-01-10 ENCOUNTER — PATIENT MESSAGE (OUTPATIENT)
Dept: FAMILY MEDICINE CLINIC | Age: 27
End: 2022-01-10

## 2022-01-10 NOTE — TELEPHONE ENCOUNTER
From: Milly Ricketts  To: Azalia Soria  Sent: 1/10/2022 2:15 PM EST  Subject: Appointment     Hi I am pretty sure I have a UTI, do I need an appointment or what should I do?

## 2022-01-10 NOTE — TELEPHONE ENCOUNTER
Future Appointments   Date Time Provider Jarred Berrios   1/11/2022  1:20 PM SCHEDULE, NURSE [place]     Patient has been informed and voiced understanding

## 2022-01-11 ENCOUNTER — HOSPITAL ENCOUNTER (OUTPATIENT)
Dept: ULTRASOUND IMAGING | Age: 27
Discharge: HOME | End: 2022-01-11

## 2022-01-11 ENCOUNTER — OFFICE VISIT (OUTPATIENT)
Dept: FAMILY MEDICINE CLINIC | Age: 27
End: 2022-01-11
Payer: COMMERCIAL

## 2022-01-11 VITALS
SYSTOLIC BLOOD PRESSURE: 134 MMHG | TEMPERATURE: 97.9 F | WEIGHT: 243 LBS | HEART RATE: 93 BPM | BODY MASS INDEX: 38.14 KG/M2 | HEIGHT: 67 IN | DIASTOLIC BLOOD PRESSURE: 88 MMHG | OXYGEN SATURATION: 98 % | RESPIRATION RATE: 12 BRPM

## 2022-01-11 DIAGNOSIS — R10.2 PELVIC PAIN IN FEMALE: ICD-10-CM

## 2022-01-11 DIAGNOSIS — N93.9 ABNORMAL VAGINAL BLEEDING: ICD-10-CM

## 2022-01-11 DIAGNOSIS — R30.0 DYSURIA: Primary | ICD-10-CM

## 2022-01-11 LAB
BILIRUBIN URINE: NEGATIVE
BLOOD URINE, POC: ABNORMAL
CHARACTER, URINE: CLEAR
COLOR, URINE: YELLOW
CONTROL: NORMAL
GLUCOSE URINE: NEGATIVE MG/DL
KETONES, URINE: NEGATIVE
LEUKOCYTE CLUMPS, URINE: NEGATIVE
NITRITE, URINE: NEGATIVE
PH, URINE: 7 (ref 5–9)
PREGNANCY TEST URINE, POC: NEGATIVE
PROTEIN, URINE: NEGATIVE MG/DL
SPECIFIC GRAVITY, URINE: 1.02 (ref 1–1.03)
UROBILINOGEN, URINE: 0.2 EU/DL (ref 0–1)

## 2022-01-11 PROCEDURE — 76856 US EXAM PELVIC COMPLETE: CPT

## 2022-01-11 PROCEDURE — 1036F TOBACCO NON-USER: CPT | Performed by: NURSE PRACTITIONER

## 2022-01-11 PROCEDURE — 99214 OFFICE O/P EST MOD 30 MIN: CPT | Performed by: NURSE PRACTITIONER

## 2022-01-11 PROCEDURE — G8427 DOCREV CUR MEDS BY ELIG CLIN: HCPCS | Performed by: NURSE PRACTITIONER

## 2022-01-11 PROCEDURE — G8417 CALC BMI ABV UP PARAM F/U: HCPCS | Performed by: NURSE PRACTITIONER

## 2022-01-11 PROCEDURE — G8484 FLU IMMUNIZE NO ADMIN: HCPCS | Performed by: NURSE PRACTITIONER

## 2022-01-11 PROCEDURE — 81025 URINE PREGNANCY TEST: CPT | Performed by: NURSE PRACTITIONER

## 2022-01-11 NOTE — PROGRESS NOTES
SUBJECTIVE:  Aura Espana is a 23 y.o. female for   Chief Complaint   Patient presents with    Dysuria     started yesterday. Bleeding started yesterday period is not supposed to start for another week.  Other     pt states that she has pain where her left ovary is        HPI:    Symptoms present for 1 days. Symptoms are better since they initially started. Dysuria? Yes - but no longer having it today  Hematuria?no  Increased urinary frequency? No  Abdominal discomfort? Yes  CVA pain? No  Hx of UTIs? No    She started having vaginal bleeding yesterday. She is not due for menses for another week. She is also having some abdominal pain on the left lower quad, pain comes and goes. Pain is like a cramp. Denies any other vaginal d/c or drainage. Patient Active Problem List   Diagnosis    Generalized anxiety disorder           OBJECTIVE:  /88   Pulse 93   Temp 97.9 °F (36.6 °C) (Oral)   Resp 12   Ht 5' 7\" (1.702 m)   Wt 243 lb (110.2 kg)   LMP 12/22/2021 (Exact Date)   SpO2 98%   BMI 38.06 kg/m²   She appears well; non-toxic and in no apparent distress. Physical Examination: Chest - clear to auscultation, no wheezes, rales or rhonchi, symmetric air entry  Abdomen - soft, LLQ and pelvic tenderness,  no CVA tenderness  Extremities - peripheral pulses normal, no pedal edema, no clubbing or cyanosis  Psych -  Affect appropriate. Thought process is normal without evidence of depression or psychosis. Good insight and appropriae interaction. Cognition and memory appear to be intact. Rebeca Spivey was seen today for dysuria and other. Diagnoses and all orders for this visit:    Dysuria  -     Culture, Urine;  Future  -     POCT Urinalysis No Micro (Auto)  -     POCT urine pregnancy    Abnormal vaginal bleeding  -     POCT urine pregnancy  -     US NON OB TRANSVAGINAL  -     US PELVIS COMPLETE    Pelvic pain in female  -     US NON OB TRANSVAGINAL  -     US PELVIS COMPLETE      - UA positive for blood, but negative for all other components  - urine pregnancy negative  - will send for culture and treat according to culture  - reviewed CT of abd from 2018 and she did have a right sided ovarian cyst. I suspect this may be the cause of her early vaginal bleeding. Will obtain stat pelvic US    Return if symptoms worsen or fail to improve.      -Start above treatments  -Urine culture was sent today  -Patient advised to contact our office immediately if symptoms worsen or persist  -Patient counseled on conservative care including fluids, rest and OTC meds      All patient questions answered. Patient voiced understanding.

## 2022-01-12 ENCOUNTER — TELEPHONE (OUTPATIENT)
Dept: FAMILY MEDICINE CLINIC | Age: 27
End: 2022-01-12

## 2022-01-12 DIAGNOSIS — N93.9 ABNORMAL VAGINAL BLEEDING: Primary | ICD-10-CM

## 2022-01-12 DIAGNOSIS — N39.0 ACUTE UTI: Primary | ICD-10-CM

## 2022-01-12 DIAGNOSIS — R10.2 PELVIC PAIN IN FEMALE: ICD-10-CM

## 2022-01-12 RX ORDER — CIPROFLOXACIN 500 MG/1
500 TABLET, FILM COATED ORAL 2 TIMES DAILY
Qty: 10 TABLET | Refills: 0 | Status: SHIPPED | OUTPATIENT
Start: 2022-01-12 | End: 2022-01-17

## 2022-01-12 NOTE — TELEPHONE ENCOUNTER
Ok, I would like to give it a little more time. Is the bleeding improving at all? If no better or worse by end of week, call. Next step would be to do a CT scan.

## 2022-01-12 NOTE — TELEPHONE ENCOUNTER
----- Message from DEL Mccauley Junior - CNP sent at 1/12/2022  3:02 PM EST -----  Let Kareem know her urine culture is coming back growing bacteria. I don't have the final culture back yet, but i'm going to go ahead and start her on Abx.  Rx sent to [facility]

## 2022-01-12 NOTE — TELEPHONE ENCOUNTER
----- Message from Madelaine Rowe, APRN - CNP sent at 1/12/2022  8:52 AM EST -----  Let Kareem know her pelvic US is normal. I would like to see how the pain and bleeding goes for now. I am waiting on her final urine culture to come back also. Is her pain better?

## 2022-01-13 ENCOUNTER — TELEPHONE (OUTPATIENT)
Dept: FAMILY MEDICINE CLINIC | Age: 27
End: 2022-01-13

## 2022-01-13 LAB
ORGANISM: ABNORMAL
URINE CULTURE, ROUTINE: ABNORMAL

## 2022-01-13 NOTE — TELEPHONE ENCOUNTER
----- Message from DEL Patel CNP sent at 1/13/2022  9:21 AM EST -----  Let Kareem know her final urine culture is growing bacteria, but the Cipro I sent in yesterday should take care it. Let's see how her pain and symptoms go, and go from there.

## 2022-01-19 ENCOUNTER — PATIENT MESSAGE (OUTPATIENT)
Dept: FAMILY MEDICINE CLINIC | Age: 27
End: 2022-01-19

## 2022-01-19 DIAGNOSIS — R10.32 LEFT LOWER QUADRANT ABDOMINAL PAIN: ICD-10-CM

## 2022-01-19 DIAGNOSIS — R10.2 PELVIC PAIN IN FEMALE: ICD-10-CM

## 2022-01-19 DIAGNOSIS — N93.9 ABNORMAL VAGINAL BLEEDING: Primary | ICD-10-CM

## 2022-01-19 NOTE — TELEPHONE ENCOUNTER
From: Dalia Aaron  To: Jenni Torrez  Sent: 1/19/2022 2:27 PM EST  Subject: Pain    So I went to the OB office today and wasnt able to be seen because my insurance wasnt valid so Im trying to figure out that so they rescheduled me for February 9, but Im still in a lot of pain on that one side it hurts a lot when I sit and when I stand at the only time it feels better is when Im laying down I dont know what else I can do or what else there is to do Ive been taking Tylenol Motrin ibuprofen works for a little bit and then the pain is just right back the pain so bad that it has me like doubled over and almost in tears at times.  What else is there that I can do

## 2022-01-24 ENCOUNTER — TELEPHONE (OUTPATIENT)
Dept: FAMILY MEDICINE CLINIC | Age: 27
End: 2022-01-24

## 2022-01-24 NOTE — TELEPHONE ENCOUNTER
Patient is scheduled 02/08/22 @ 750 am arrival at [facility]. She will need to be NPO for 4 hours prior to testing. Left message to inform patient.

## 2022-02-08 ENCOUNTER — TELEPHONE (OUTPATIENT)
Dept: FAMILY MEDICINE CLINIC | Age: 27
End: 2022-02-08

## 2022-02-08 ENCOUNTER — HOSPITAL ENCOUNTER (OUTPATIENT)
Dept: CT IMAGING | Age: 27
Discharge: HOME | End: 2022-02-08
Payer: COMMERCIAL

## 2022-02-08 DIAGNOSIS — R10.2 PELVIC PAIN IN FEMALE: ICD-10-CM

## 2022-02-08 DIAGNOSIS — R10.32 LEFT LOWER QUADRANT ABDOMINAL PAIN: ICD-10-CM

## 2022-02-08 DIAGNOSIS — N93.9 ABNORMAL VAGINAL BLEEDING: ICD-10-CM

## 2022-02-08 PROCEDURE — 6360000004 HC RX CONTRAST MEDICATION: Performed by: NURSE PRACTITIONER

## 2022-02-08 PROCEDURE — 74178 CT ABD&PLV WO CNTR FLWD CNTR: CPT

## 2022-02-08 RX ADMIN — IOPAMIDOL 80 ML: 755 INJECTION, SOLUTION INTRAVENOUS at 09:12

## 2022-02-08 RX ADMIN — IOHEXOL 50 ML: 240 INJECTION, SOLUTION INTRATHECAL; INTRAVASCULAR; INTRAVENOUS; ORAL at 09:12

## 2022-02-08 NOTE — TELEPHONE ENCOUNTER
----- Message from DEL Meza CNP sent at 2/8/2022 12:32 PM EST -----  Let Kareem know her CT abdomen is normal. It does show some prominence or enlargement of her ovaries, but otherwise CT is normal. I would rec'd she f/u with the OBGYN.

## 2022-04-29 DIAGNOSIS — F51.04 PSYCHOPHYSIOLOGIC INSOMNIA: ICD-10-CM

## 2022-04-29 DIAGNOSIS — F41.1 GENERALIZED ANXIETY DISORDER: ICD-10-CM

## 2022-04-29 RX ORDER — SERTRALINE HYDROCHLORIDE 100 MG/1
150 TABLET, FILM COATED ORAL DAILY
Qty: 135 TABLET | Refills: 1 | Status: SHIPPED | OUTPATIENT
Start: 2022-04-29

## 2022-04-29 RX ORDER — TRAZODONE HYDROCHLORIDE 50 MG/1
50 TABLET ORAL NIGHTLY PRN
Qty: 90 TABLET | Refills: 1 | Status: SHIPPED | OUTPATIENT
Start: 2022-04-29